# Patient Record
Sex: FEMALE | Race: WHITE | HISPANIC OR LATINO | Employment: PART TIME | URBAN - METROPOLITAN AREA
[De-identification: names, ages, dates, MRNs, and addresses within clinical notes are randomized per-mention and may not be internally consistent; named-entity substitution may affect disease eponyms.]

---

## 2017-03-08 ENCOUNTER — TELEPHONE (OUTPATIENT)
Dept: FAMILY MEDICINE | Facility: OTHER | Age: 37
End: 2017-03-08

## 2017-03-08 NOTE — TELEPHONE ENCOUNTER
Pt is past due for fu pap smear  Reminder letter was sent 1/13/17  LMTC and schedule at [facility]  Left this writers number in case of questions  If no reply and/or appt within two weeks (3/22/17) patient will be considered lost to pap tracking f/u.  Goldie Nesbitt,   Pap Tracking

## 2017-07-19 NOTE — PROGRESS NOTES
SUBJECTIVE:   CC: Hannah Young is an 37 year old woman who presents for preventive health visit.     Healthy Habits:    Do you get at least three servings of calcium containing foods daily (dairy, green leafy vegetables, etc.)? yes    Amount of exercise or daily activities, outside of work: none    Problems taking medications regularly No    Medication side effects: No    Have you had an eye exam in the past two years? no    Do you see a dentist twice per year? yes    Do you have sleep apnea, excessive snoring or daytime drowsiness?no          History of abnormal pap smear in the past , here for recheck pap    she reports her period has become heavier every other month the last 2 months and occasionally irregular , she is not on hormone pills,    usually will get light periods, but every other month , period will be heavy     Today's PHQ-2 Score:   PHQ-2 ( 1999 [employer]) 6/14/2016 1/15/2016   Q1: Little interest or pleasure in doing things 0 0   Q2: Feeling down, depressed or hopeless 0 0   PHQ-2 Score 0 0         Abuse: Current or Past(Physical, Sexual or Emotional)- No  Do you feel safe in your environment - Yes  Social History   Substance Use Topics     Smoking status: Never Smoker     Smokeless tobacco: Never Used      Comment: no smokers in household     Alcohol use 0.0 oz/week     0 Standard drinks or equivalent per week      Comment: wine cooler x2/3-4x per yr     The patient does not drink >3 drinks per day nor >7 drinks per week.    Reviewed orders with patient.  Reviewed health maintenance and updated orders accordingly - Yes  Labs reviewed in EPIC  BP Readings from Last 3 Encounters:   07/21/17 124/70   06/14/16 112/66   06/05/16 102/66    Wt Readings from Last 3 Encounters:   07/21/17 234 lb 14.4 oz (106.5 kg)   06/14/16 227 lb 1.6 oz (103 kg)   06/05/16 221 lb (100.2 kg)                  Patient Active Problem List   Diagnosis     Plantar fascial fibromatosis     Encounter for supervision of other  normal pregnancy     PFO (patent foramen ovale)     History of sepsis- blood clot and septic PICC, PFO found.  requesting records as they are archieved.      Hyperlipidemia LDL goal <130     MARGARETTE III (cervical intraepithelial neoplasia grade III) with severe dysplasia     Rheumatoid arthritis flare (H)     Chronic fatigue syndrome     Body mass index (BMI) greater than 30 in adult     Past Surgical History:   Procedure Laterality Date     HC CONIZATION CERVIX,KNIFE/LASER  10/04/2005    Cold knife conization of cervix and endometrial biopsy.     TUBAL LIGATION         Social History   Substance Use Topics     Smoking status: Never Smoker     Smokeless tobacco: Never Used      Comment: no smokers in household     Alcohol use 0.0 oz/week     0 Standard drinks or equivalent per week      Comment: wine cooler x2/3-4x per yr     Family History   Problem Relation Age of Onset     Respiratory Brother      Asthma     CANCER No family hx of      DIABETES No family hx of      HEART DISEASE No family hx of      Hypertension No family hx of      Lipids No family hx of          Current Outpatient Prescriptions   Medication Sig Dispense Refill     Naproxen Sodium (ALEVE PO) Take 220 mg by mouth as needed for moderate pain       traMADol (ULTRAM) 50 MG tablet Take 1-2 tablets ( mg) by mouth every 6 hours as needed for moderate pain (Patient not taking: Reported on 7/21/2017) 20 tablet 0     Allergies   Allergen Reactions     No Known Drug Allergies            Mammogram not appropriate for this patient based on age.    Pertinent mammograms are reviewed under the imaging tab.  History of abnormal Pap smear:   NO - age 30- 65 PAP every 3 years recommended  Last 3 Pap Results:   PAP (no units)   Date Value   01/15/2016 NIL   06/29/2005 ASC-US (A)       Reviewed and updated as needed this visit by clinical staff  Tobacco  Allergies  Meds  Problems  Med Hx  Surg Hx  Fam Hx  Soc Hx          Reviewed and updated as needed this  "visit by Provider  Allergies  Meds  Problems        Past Medical History:   Diagnosis Date     MARGARETTE III (cervical intraepithelial neoplasia grade III) with severe dysplasia 05    CKC, negative margins     NONSPECIFIC MEDICAL HISTORY     HPV/10-02     Normal delivery          Sepsis (H)      Stroke (H)      Varicella without mention of complication     Chickenpox      Past Surgical History:   Procedure Laterality Date     HC CONIZATION CERVIX,KNIFE/LASER  10/04/2005    Cold knife conization of cervix and endometrial biopsy.     TUBAL LIGATION         ROS:  C: NEGATIVE for fever, chills, change in weight  I: NEGATIVE for worrisome rashes, moles or lesions  E: NEGATIVE for vision changes or irritation  ENT: NEGATIVE for ear, mouth and throat problems  R: NEGATIVE for significant cough or SOB  B: NEGATIVE for masses, tenderness or discharge  CV: NEGATIVE for chest pain, palpitations or peripheral edema  GI: NEGATIVE for nausea, abdominal pain, heartburn, or change in bowel habits  : NEGATIVE for unusual urinary or vaginal symptoms. Periods are regular.  M: NEGATIVE for significant arthralgias or myalgia  N: NEGATIVE for weakness, dizziness or paresthesias  P: NEGATIVE for changes in mood or affect    OBJECTIVE:   /70  Pulse 84  Temp 98  F (36.7  C) (Temporal)  Resp 16  Ht 5' 6\" (1.676 m)  Wt 234 lb 14.4 oz (106.5 kg)  Breastfeeding? No  BMI 37.91 kg/m2  EXAM:  GENERAL:alert and no distress  EYES: Eyes grossly normal to inspection, PERRL and conjunctivae and sclerae normal  HENT: ear canals and TM's normal, nose and mouth without ulcers or lesions  NECK: no adenopathy, no asymmetry, masses, or scars and thyroid normal to palpation  RESP: lungs clear to auscultation - no rales, rhonchi or wheezes  BREAST: normal without masses, tenderness or nipple discharge and no palpable axillary masses or adenopathy  CV: regular rate and rhythm, normal S1 S2, no S3 or S4, no murmur, click or rub, no " "peripheral edema and peripheral pulses strong  ABDOMEN: soft, nontender, no hepatosplenomegaly, no masses and bowel sounds normal   (female): normal female external genitalia, normal urethral meatus, vaginal mucosa pink, moist, well rugated, and normal cervix/adnexa/uterus without masses or discharge  MS: no gross musculoskeletal defects noted, no edema  SKIN: no suspicious lesions or rashes  NEURO: Normal strength and tone, mentation intact and speech normal  PSYCH: mentation appears normal, affect normal/bright    ASSESSMENT/PLAN:       ICD-10-CM    1. Routine general medical examination at a health care facility Z00.00    2. Screening for cervical cancer Z12.4 Pap imaged thin layer diagnostic with HPV (select HPV order below)     HPV High Risk Types DNA Cervical   3. MARGARETTE III (cervical intraepithelial neoplasia grade III) with severe dysplasia D06.9 Pap imaged thin layer diagnostic with HPV (select HPV order below)     HPV High Risk Types DNA Cervical   4. Excessive or frequent menstruation N92.0       Discussed with patient , could be due to hormonal imbalance , also possible fibroids , discussed getting pelvic ultrasound and if normal , work up for hormonal imbalance , she reports does not happen all the time , will monitor, if ongoing will call to schedule for ultrasound and further work up     COUNSELING:   Reviewed preventive health counseling, as reflected in patient instructions         reports that she has never smoked. She has never used smokeless tobacco.    Estimated body mass index is 37.91 kg/(m^2) as calculated from the following:    Height as of this encounter: 5' 6\" (1.676 m).    Weight as of this encounter: 234 lb 14.4 oz (106.5 kg).   Weight management plan: Regular exercises and diet changes    Counseling Resources:  ATP IV Guidelines  Pooled Cohorts Equation Calculator  Breast Cancer Risk Calculator  FRAX Risk Assessment  ICSI Preventive Guidelines  Dietary Guidelines for Americans, " 2010  USDA's MyPlate  ASA Prophylaxis  Lung CA Screening    Matilde Ashley MD, MD  [facility]

## 2017-07-21 ENCOUNTER — OFFICE VISIT (OUTPATIENT)
Dept: FAMILY MEDICINE | Facility: OTHER | Age: 37
End: 2017-07-21
Payer: COMMERCIAL

## 2017-07-21 VITALS
WEIGHT: 234.9 LBS | RESPIRATION RATE: 16 BRPM | BODY MASS INDEX: 37.75 KG/M2 | HEIGHT: 66 IN | HEART RATE: 84 BPM | TEMPERATURE: 98 F | DIASTOLIC BLOOD PRESSURE: 70 MMHG | SYSTOLIC BLOOD PRESSURE: 124 MMHG

## 2017-07-21 DIAGNOSIS — Z12.4 SCREENING FOR CERVICAL CANCER: ICD-10-CM

## 2017-07-21 DIAGNOSIS — N92.0 EXCESSIVE OR FREQUENT MENSTRUATION: ICD-10-CM

## 2017-07-21 DIAGNOSIS — Z00.00 ROUTINE GENERAL MEDICAL EXAMINATION AT A HEALTH CARE FACILITY: Primary | ICD-10-CM

## 2017-07-21 DIAGNOSIS — D06.9 CIN III (CERVICAL INTRAEPITHELIAL NEOPLASIA GRADE III) WITH SEVERE DYSPLASIA: ICD-10-CM

## 2017-07-21 PROCEDURE — 87624 HPV HI-RISK TYP POOLED RSLT: CPT | Performed by: FAMILY MEDICINE

## 2017-07-21 PROCEDURE — 99395 PREV VISIT EST AGE 18-39: CPT | Performed by: FAMILY MEDICINE

## 2017-07-21 PROCEDURE — 99213 OFFICE O/P EST LOW 20 MIN: CPT | Mod: 25 | Performed by: FAMILY MEDICINE

## 2017-07-21 PROCEDURE — 88175 CYTOPATH C/V AUTO FLUID REDO: CPT | Performed by: FAMILY MEDICINE

## 2017-07-21 PROCEDURE — G0476 HPV COMBO ASSAY CA SCREEN: HCPCS | Performed by: FAMILY MEDICINE

## 2017-07-21 ASSESSMENT — PAIN SCALES - GENERAL: PAINLEVEL: NO PAIN (0)

## 2017-07-21 NOTE — MR AVS SNAPSHOT
After Visit Summary   7/21/2017    Hannah Young    MRN: [id number]           Patient Information     Date Of Birth          1980        Visit Information        Provider Department      7/21/2017 3:20 PM Matilde Ashley MD [facility]        Today's Diagnoses     Routine general medical examination at a health care facility    -  1    Screening for cervical cancer        MARGARETTE III (cervical intraepithelial neoplasia grade III) with severe dysplasia          Care Instructions      Preventive Health Recommendations  Female Ages 26 - 39  Yearly exam:   See your health care provider every year in order to    Review health changes.     Discuss preventive care.      Review your medicines if you your doctor has prescribed any.    Until age 30: Get a Pap test every three years (more often if you have had an abnormal result).    After age 30: Talk to your doctor about whether you should have a Pap test every 3 years or have a Pap test with HPV screening every 5 years.   You do not need a Pap test if your uterus was removed (hysterectomy) and you have not had cancer.  You should be tested each year for STDs (sexually transmitted diseases), if you're at risk.   Talk to your provider about how often to have your cholesterol checked.  If you are at risk for diabetes, you should have a diabetes test (fasting glucose).  Shots: Get a flu shot each year. Get a tetanus shot every 10 years.   Nutrition:     Eat at least 5 servings of fruits and vegetables each day.    Eat whole-grain bread, whole-wheat pasta and brown rice instead of white grains and rice.    Talk to your provider about Calcium and Vitamin D.     Lifestyle    Exercise at least 150 minutes a week (30 minutes a day, 5 days of the week). This will help you control your weight and prevent disease.    Limit alcohol to one drink per day.    No smoking.     Wear sunscreen to prevent skin cancer.    See your dentist every six months  "for an exam and cleaning.            Follow-ups after your visit        Who to contact     If you have questions or need follow up information about today's clinic visit or your schedule please contact [facility] directly at [phone].  Normal or non-critical lab and imaging results will be communicated to you by theDrophart, letter or phone within 4 business days after the clinic has received the results. If you do not hear from us within 7 days, please contact the clinic through theDrophart or phone. If you have a critical or abnormal lab result, we will notify you by phone as soon as possible.  Submit refill requests through [employer] or call your pharmacy and they will forward the refill request to us. Please allow 3 business days for your refill to be completed.          Additional Information About Your Visit        theDrophariCAD Information     [employer] lets you send messages to your doctor, view your test results, renew your prescriptions, schedule appointments and more. To sign up, go to [email or web address] . Click on \"Log in\" on the left side of the screen, which will take you to the Welcome page. Then click on \"Sign up Now\" on the right side of the page.     You will be asked to enter the access code listed below, as well as some personal information. Please follow the directions to create your username and password.     Your access code is: [email or web address]  Expires: 10/19/2017  3:56 PM     Your access code will  in 90 days. If you need help or a new code, please call your [facility] or [phone].        Care EveryWhere ID     This is your Care EveryWhere ID. This could be used by other organizations to access your [facility] medical records  [id number]        Your Vitals Were     Pulse Temperature Respirations Height Breastfeeding? BMI (Body Mass Index)    84 98  F (36.7  C) (Temporal) 16 5' 6\" (1.676 m) No 37.91 kg/m2       Blood Pressure from Last 3 Encounters:   17 124/70 "   06/14/16 112/66   06/05/16 102/66    Weight from Last 3 Encounters:   07/21/17 234 lb 14.4 oz (106.5 kg)   06/14/16 227 lb 1.6 oz (103 kg)   06/05/16 221 lb (100.2 kg)              We Performed the Following     HPV High Risk Types DNA Cervical     Pap imaged thin layer diagnostic with HPV (select HPV order below)        Primary Care Provider Office Phone # Fax #    Radha UREÑA MD Behzad [phone]       [facility] [address] [facility] [address]  [facility] [address]        Equal Access to Services     [facility]: Hadii reanna ku hadasho Soomaali, waaxda luqadaha, qaybta kaalmada adelashaunyada, meka jamison . So [facility] [phone].    ATENCIÓN: Si habla español, tiene a rojo disposición servicios gratuitos de asistencia lingüística. LlSelect [facility] [phone].    We comply with applicable federal civil rights laws and [place] laws. We do not discriminate on the basis of race, color, national origin, age, disability sex, sexual orientation or gender identity.            Thank you!     Thank you for choosing [facility]  for your care. Our goal is always to provide you with excellent care. Hearing back from our patients is one way we can continue to improve our services. Please take a few minutes to complete the written survey that you may receive in the mail after your visit with us. Thank you!             Your Updated Medication List - Protect others around you: Learn how to safely use, store and throw away your medicines at www.disposemymeds.org.          This list is accurate as of: 7/21/17  3:56 PM.  Always use your most recent med list.                   Brand Name Dispense Instructions for use Diagnosis    ALEVE PO      Take 220 mg by mouth as needed for moderate pain        traMADol 50 MG tablet    ULTRAM    20 tablet    Take 1-2 tablets ( mg) by mouth every 6 hours as needed for moderate pain    Joint swelling, Arthralgia of both knees

## 2017-07-21 NOTE — LETTER
August 3, 2017    Hannah Young  [address] [place] [address]    Dear Hannah,  We are happy to inform you that your PAP smear result from 7/21/17 is normal.  We are now able to do a follow up test on PAP smears. The DNA test is for HPV (Human Papilloma Virus). Cervical cancer is closely linked with certain types of HPV. Your result showed no evidence of HPV.  Therefore we recommend you return in 3 years for your next pap smear and HPV test.  You will still need to return to the clinic every year for an annual exam and other preventive tests.  Please contact the clinic at [phone] with any questions.  Sincerely,    Matilde Ashley MD/emily

## 2017-07-21 NOTE — NURSING NOTE
"Chief Complaint   Patient presents with     Physical     Panel Management     papclaribel        Initial /70  Pulse 84  Temp 98  F (36.7  C) (Temporal)  Resp 16  Ht 5' 6\" (1.676 m)  Wt 234 lb 14.4 oz (106.5 kg)  Breastfeeding? No  BMI 37.91 kg/m2 Estimated body mass index is 37.91 kg/(m^2) as calculated from the following:    Height as of this encounter: 5' 6\" (1.676 m).    Weight as of this encounter: 234 lb 14.4 oz (106.5 kg).  Medication Reconciliation: complete     Marcelle Caballero MA    "

## 2017-07-26 LAB
COPATH REPORT: NORMAL
PAP: NORMAL

## 2017-07-27 ENCOUNTER — TRANSFERRED RECORDS (OUTPATIENT)
Dept: HEALTH INFORMATION MANAGEMENT | Facility: CLINIC | Age: 37
End: 2017-07-27

## 2017-07-27 LAB
FINAL DIAGNOSIS: NORMAL
HPV HR 12 DNA CVX QL NAA+PROBE: NEGATIVE
HPV16 DNA SPEC QL NAA+PROBE: NEGATIVE
HPV18 DNA SPEC QL NAA+PROBE: NEGATIVE
SPECIMEN DESCRIPTION: NORMAL

## 2020-05-27 ENCOUNTER — HOSPITAL ENCOUNTER (EMERGENCY)
Facility: CLINIC | Age: 40
Discharge: HOME | End: 2020-05-27
Attending: FAMILY MEDICINE | Admitting: FAMILY MEDICINE
Payer: COMMERCIAL

## 2020-05-27 ENCOUNTER — NURSE TRIAGE (OUTPATIENT)
Dept: NURSING | Facility: CLINIC | Age: 40
End: 2020-05-27

## 2020-05-27 VITALS
SYSTOLIC BLOOD PRESSURE: 144 MMHG | TEMPERATURE: 98.4 F | BODY MASS INDEX: 42.11 KG/M2 | RESPIRATION RATE: 18 BRPM | WEIGHT: 260.9 LBS | OXYGEN SATURATION: 96 % | DIASTOLIC BLOOD PRESSURE: 90 MMHG

## 2020-05-27 DIAGNOSIS — R42 DIZZINESS: ICD-10-CM

## 2020-05-27 DIAGNOSIS — K62.5 BRIGHT RED BLOOD PER RECTUM: ICD-10-CM

## 2020-05-27 LAB
BASOPHILS # BLD AUTO: 0 10E9/L (ref 0–0.2)
BASOPHILS NFR BLD AUTO: 0.4 %
DIFFERENTIAL METHOD BLD: NORMAL
EOSINOPHIL NFR BLD AUTO: 0.8 %
ERYTHROCYTE [DISTWIDTH] IN BLOOD BY AUTOMATED COUNT: 12.1 % (ref 10–15)
HCT VFR BLD AUTO: 39.5 % (ref 35–47)
HGB BLD-MCNC: 13.3 G/DL (ref 11.7–15.7)
IMM GRANULOCYTES # BLD: 0 10E9/L (ref 0–0.4)
IMM GRANULOCYTES NFR BLD: 0.3 %
LYMPHOCYTES # BLD AUTO: 1.8 10E9/L (ref 0.8–5.3)
LYMPHOCYTES NFR BLD AUTO: 24.6 %
MCH RBC QN AUTO: 32.1 PG (ref 26.5–33)
MCHC RBC AUTO-ENTMCNC: 33.7 G/DL (ref 31.5–36.5)
MCV RBC AUTO: 95 FL (ref 78–100)
MONOCYTES # BLD AUTO: 0.5 10E9/L (ref 0–1.3)
MONOCYTES NFR BLD AUTO: 6.3 %
NEUTROPHILS # BLD AUTO: 4.8 10E9/L (ref 1.6–8.3)
NEUTROPHILS NFR BLD AUTO: 67.6 %
NRBC # BLD AUTO: 0 10*3/UL
NRBC BLD AUTO-RTO: 0 /100
PLATELET # BLD AUTO: 246 10E9/L (ref 150–450)
RBC # BLD AUTO: 4.14 10E12/L (ref 3.8–5.2)
WBC # BLD AUTO: 7.1 10E9/L (ref 4–11)

## 2020-05-27 PROCEDURE — 99282 EMERGENCY DEPT VISIT SF MDM: CPT | Mod: Z6 | Performed by: FAMILY MEDICINE

## 2020-05-27 PROCEDURE — 85025 COMPLETE CBC W/AUTO DIFF WBC: CPT | Performed by: FAMILY MEDICINE

## 2020-05-27 PROCEDURE — 99283 EMERGENCY DEPT VISIT LOW MDM: CPT | Performed by: FAMILY MEDICINE

## 2020-05-27 ASSESSMENT — ENCOUNTER SYMPTOMS
FEVER: 0
CHILLS: 0

## 2020-05-27 NOTE — ED PROVIDER NOTES
History     Chief Complaint   Patient presents with     Rectal Bleeding     HPI  Hannah Pacheco is a 40 year old female who presents to the emergency department with several symptoms.  Her main complaint is that she had a bowel movement this morning and she noticed that she passed some bright red blood.  There was one small clot.  She had a completely painless defecation.  She denies any history of severe constipation or hard stools.  She had 3 children with vaginal births without trouble but this last child was 12 years ago.  She has no history of diagnosis of hemorrhoids.  She has no sensation of rectal pain.  She does not feel any hemorrhoids.  She has never had a colonoscopy.  She has no history of melena.  She also had an episode where she felt a little lightheaded and dizzy a little weak like she might pass out.  She has no history of recent anemia.  Her periods are a little heavier than usual but not inordinate.  She is not due for a period for another week.  She has not missed any.  She also had an episode where she had numbness and tingling.  This episode was more on the left side.  She was diagnosed with a patent foramen ovale during her pregnancy but was checked 1 year later by cardiology and none was found.  No further work-up or intervention was recommended.  She has no history of other strokelike symptoms.  She otherwise is feeling well.    Allergies:  Allergies   Allergen Reactions     No Known Drug Allergies        Problem List:    Patient Active Problem List    Diagnosis Date Noted     Rheumatoid arthritis flare (H) 06/14/2016     Priority: Medium     Chronic fatigue syndrome 06/14/2016     Priority: Medium     PFO (patent foramen ovale) 01/15/2016     Priority: Medium     History of sepsis- blood clot and septic PICC, PFO found.  requesting records as they are archieved.  01/15/2016     Priority: Medium     Hyperlipidemia LDL goal <130 01/15/2016     Priority: Medium     Body mass index (BMI)  greater than 30 in adult 11/14/2012     Priority: Medium     Encounter for supervision of other normal pregnancy 05/01/2006     Priority: Medium     Diagnosis updated by automated process. Provider to review and confirm.       MARGARETTE III (cervical intraepithelial neoplasia grade III) with severe dysplasia 09/05/2005     Priority: Medium     10/11/02 ASCUS  9/11/03 LSIL/CIN1  6/29/05 ASCUS/ + HR HPV (16)  8/15/05 colp: MARGARETTE 2  9/12/05 ECC: CIN3, negative margins  10/4/05 CKC: EMB WNL, ECC WNL, Cervix bx CIN3, negative margins  12/9/06, 11/16/07, 11/3/12, paps all NIL  1/15/16 pap NIL/neg HR HPV. First cotest since [facility]. Plan: repeat cotest in 12 mo.   04/08/17 Would consider patient to be lost to follow-up.  7/21/17 NIL Pap, Neg HPV. Plan cotest in 3 years.        Plantar fascial fibromatosis 09/11/2003     Priority: Medium        Past Medical History:    Past Medical History:   Diagnosis Date     MARGARETTE III (cervical intraepithelial neoplasia grade III) with severe dysplasia 9/5/05     NONSPECIFIC MEDICAL HISTORY      Normal delivery      Sepsis (H)      Stroke (H)      Varicella without mention of complication        Past Surgical History:    Past Surgical History:   Procedure Laterality Date     HC CONIZATION CERVIX,KNIFE/LASER  10/04/2005    Cold knife conization of cervix and endometrial biopsy.     TUBAL LIGATION         Family History:    Family History   Problem Relation Age of Onset     Respiratory Brother         Asthma     Cancer No family hx of      Diabetes No family hx of      Heart Disease No family hx of      Hypertension No family hx of      Lipids No family hx of        Social History:  Marital Status:   [2]  Social History     Tobacco Use     Smoking status: Never Smoker     Smokeless tobacco: Never Used     Tobacco comment: no smokers in household   Substance Use Topics     Alcohol use: Yes     Alcohol/week: 0.0 standard drinks     Comment: wine cooler x2/3-4x per yr     Drug use: No         Medications:    Naproxen Sodium (ALEVE PO)  traMADol (ULTRAM) 50 MG tablet          Review of Systems   Constitutional: Negative for chills and fever.   All other systems reviewed and are negative.      Physical Exam   BP: (!) 144/90  Heart Rate: 80  Temp: 98.4  F (36.9  C)  Resp: 18  Weight: 118.3 kg (260 lb 14.4 oz)  SpO2: 96 %      Physical Exam  Vitals signs and nursing note reviewed.   Constitutional:       Appearance: Normal appearance.      Comments: Alert pleasant smiling well-nourished well-hydrated 40-year-old who appears in no acute distress or discomfort.BP (!) 144/90   Temp 98.4  F (36.9  C) (Oral)   Resp 18   Wt 118.3 kg (260 lb 14.4 oz)   SpO2 96%   BMI 42.11 kg/m       HENT:      Head: Normocephalic and atraumatic.      Right Ear: Tympanic membrane normal.      Left Ear: Tympanic membrane normal.      Nose: Nose normal.      Mouth/Throat:      Mouth: Mucous membranes are moist.   Eyes:      Extraocular Movements: Extraocular movements intact.      Pupils: Pupils are equal, round, and reactive to light.   Neck:      Musculoskeletal: Normal range of motion.   Cardiovascular:      Rate and Rhythm: Normal rate and regular rhythm.      Pulses: Normal pulses.   Pulmonary:      Effort: Pulmonary effort is normal.   Genitourinary:     Comments: Declined rectal examination  Musculoskeletal: Normal range of motion.   Skin:     Capillary Refill: Capillary refill takes less than 2 seconds.   Neurological:      General: No focal deficit present.      Mental Status: She is alert and oriented to person, place, and time.   Psychiatric:         Mood and Affect: Mood normal.         Behavior: Behavior normal.         ED Course        Procedures               Critical Care time:  none               Results for orders placed or performed during the hospital encounter of 05/27/20 (from the past 24 hour(s))   CBC with platelets differential   Result Value Ref Range    WBC 7.1 4.0 - 11.0 10e9/L    RBC Count 4.14  3.8 - 5.2 10e12/L    Hemoglobin 13.3 11.7 - 15.7 g/dL    Hematocrit 39.5 35.0 - 47.0 %    MCV 95 78 - 100 fl    MCH 32.1 26.5 - 33.0 pg    MCHC 33.7 31.5 - 36.5 g/dL    RDW 12.1 10.0 - 15.0 %    Platelet Count 246 150 - 450 10e9/L    Diff Method Automated Method     % Neutrophils 67.6 %    % Lymphocytes 24.6 %    % Monocytes 6.3 %    % Eosinophils 0.8 %    % Basophils 0.4 %    % Immature Granulocytes 0.3 %    Nucleated RBCs 0 0 /100    Absolute Neutrophil 4.8 1.6 - 8.3 10e9/L    Absolute Lymphocytes 1.8 0.8 - 5.3 10e9/L    Absolute Monocytes 0.5 0.0 - 1.3 10e9/L    Absolute Basophils 0.0 0.0 - 0.2 10e9/L    Abs Immature Granulocytes 0.0 0 - 0.4 10e9/L    Absolute Nucleated RBC 0.0        Medications - No data to display    Assessments & Plan (with Medical Decision Making)   MDM--40-year-old female with bright red blood per rectum.  I discussed the exam involved in this type of work-up and she declined on any type of rectal or proctoscopy.  I also did not feel it was necessary as her source of bleeding is most likely internal hemorrhoids.  She has varicose veins from pregnancies.  Discussed the need for further work-up if this persists.  Discussed a high-fiber diet.  I did check her hemoglobin as she did have an episode where she felt lightheaded and weak and there is no evidence of anemia as in etiology for this episode.  She also had an episode of numbness which was worse on her left side which has completely resolved.  Neurologically everything is normal and she has no symptoms and she has a normal neurological exam.  She at one time was told she had a patent foramen ovale but then was told a year later that none could be found and that she did not need any further work-up.  I do not feel she needs any neurological work-up at this point.  Patient reassured and discharged home in stable condition.  She is comfortable with this evaluation limited work-up and is discharged in stable condition.      I have reviewed  the nursing notes.    I have reviewed the findings, diagnosis, plan and need for follow up with the patient.       Discharge Medication List as of 5/27/2020  3:34 PM          Final diagnoses:   Bright red blood per rectum   Dizziness       5/27/2020   [facility] EMERGENCY DEPARTMENT     Natan, Eleazar PINON MD  05/27/20 [id number]

## 2020-05-27 NOTE — ED AVS SNAPSHOT
[facility] Emergency Department  [address] [place] [address]  Phone:  [phone]  Fax:  [phone]                                    Hannah Pacheco   MRN: [id number]    Department:  [facility] Emergency Department   Date of Visit:  5/27/2020           After Visit Summary Signature Page    I have received my discharge instructions, and my questions have been answered. I have discussed any challenges I see with this plan with the nurse or doctor.    ..........................................................................................................................................  Patient/Patient Representative Signature      ..........................................................................................................................................  Patient Representative Print Name and Relationship to Patient    ..................................................               ................................................  Date                                   Time    ..........................................................................................................................................  Reviewed by Signature/Title    ...................................................              ..............................................  Date                                               Time          22EPIC Rev 08/18

## 2020-05-27 NOTE — ED TRIAGE NOTES
Had a bloody stool this morning, states there was a large clot, and a few small ones.  also had numbness on the left side of her body this morning for about 10 min.  did have a tooth pulled yesterday on the left side of her mouth, currently no issues with this.

## 2020-05-27 NOTE — TELEPHONE ENCOUNTER
Pt states she does not have a PCP.   Today she is having some bleeding in her stool. States it is coming out as clots.   States some discomfort in left side, rates it as a mild/moderate pain.   Denies fever.   Feeling slightly dizzy.   Toilet bowl was red.   Numb in hand, arm and foot on the left side, same side as a weird discomfort. States it lasted 10 minute, denies this currently  Large blood clots, some smaller than golf ball size and some bigger than a dime.  Pt advised of recommendation to go to ED, have someone drive her. Pt stated understanding and will have someone drive her to the ED for evaluation.    Aida Kaplan, RN   [facility] RN Triage        COVID 19 Nurse Triage Plan/Patient Instructions    Please be aware that novel coronavirus (COVID-19) may be circulating in the community. If you develop symptoms such as fever, cough, or SOB or if you have concerns about the presence of another infection including coronavirus (COVID-19), please contact your health care provider or visit [email or web address].     Disposition/Instructions    Patient to go to ED and follow protocol based instructions. Follow System Ambulatory Workflow for COVID 19.     Bring Your Own Device:  Please also bring your smart device(s) (smart phones, tablets, laptops) and their charging cables for your personal use and to communicate with your care team during your visit.    Thank you for limiting contact with others, wearing a simple mask to cover your cough, practice good hand hygiene habits and accessing our virtual services where possible to limit the spread of this virus.    For more information about COVID19 and options for caring for yourself at home, please visit the CDC website at https://www.cdc.gov/coronavirus/2019-ncov/about/steps-when-sick.html  For more options for care at [facility], please visit our website at [email or web address]    For more information, please use the [place]  "Department of Health COVID-19 Website: https://www.health.[facility].[place]/diseases/coronavirus/index.html  [place] Department of Health ([facility]) COVID-19 Hotlines (Interpreters available):      Health questions: Phone Number: [phone] or [phone] and Hours: 7 a.m. to 7 p.m.    Schools and  questions: Phone Number: [phone] or [phone] and Hours 7 a.m. to 7 p.m.      Reason for Disposition    SEVERE rectal bleeding (large blood clots; on and off, or constant bleeding)    [1] Numbness (i.e., loss of sensation) of the face, arm / hand, or leg / foot on one side of the body AND [2] sudden onset AND [3] brief (now gone)    Additional Information    Negative: Shock suspected (e.g., cold/pale/clammy skin, too weak to stand, low BP, rapid pulse)    Negative: Passed out (i.e., lost consciousness, collapsed and was not responding)    Negative: [1] Vomiting AND [2] contains red blood or black (\"coffee ground\") material  (Exception: few red streaks in vomit that only happened once)    Negative: Sounds like a life-threatening emergency to the triager    Negative: Difficult to awaken or acting confused (e.g., disoriented, slurred speech)    Negative: Diarrhea is main symptom    Negative: Stool color other than brown or tan is main concern  (no bleeding and no melena)    Negative: [1] Numbness (i.e., loss of sensation) of the face, arm / hand, or leg / foot on one side of the body AND [2] sudden onset AND [3] present now    Negative: [1] Weakness (i.e., paralysis, loss of muscle strength) of the face, arm / hand, or leg / foot on one side of the body AND [2] sudden onset AND [3] present now    Negative: [1] SEVERE weakness (i.e., unable to walk or barely able to walk, requires support) AND [2] new onset or worsening    Negative: [1] Loss of speech or garbled speech AND [2] sudden onset AND [3] present now    Negative: Difficult to awaken or acting confused (e.g., disoriented, slurred speech)    Negative: " Sounds like a life-threatening emergency to the triager    Negative: Headache  (and neurologic deficit)    Negative: [1] Back pain AND [2] numbness (loss of sensation) in groin or rectal area    Negative: [1] Unable to urinate (or only a few drops) > 4 hours AND [2] bladder feels very full (e.g., palpable bladder or strong urge to urinate)    Negative: [1] Loss of bladder or bowel control (urine or bowel incontinence; wetting self, leaking stool) AND [2] new onset    Negative: [1] Weakness (i.e., paralysis, loss of muscle strength) of the face, arm / hand, or leg / foot on one side of the body AND [2] sudden onset AND [3] brief (now gone)    Protocols used: RECTAL BLEEDING-A-AH, NEUROLOGIC DEFICIT-A-AH

## 2023-01-19 ENCOUNTER — OFFICE VISIT (OUTPATIENT)
Dept: FAMILY MEDICINE | Facility: CLINIC | Age: 43
End: 2023-01-19
Payer: COMMERCIAL

## 2023-01-19 VITALS
SYSTOLIC BLOOD PRESSURE: 136 MMHG | TEMPERATURE: 98.2 F | BODY MASS INDEX: 41.29 KG/M2 | HEART RATE: 80 BPM | OXYGEN SATURATION: 96 % | WEIGHT: 256.9 LBS | HEIGHT: 66 IN | DIASTOLIC BLOOD PRESSURE: 82 MMHG | RESPIRATION RATE: 16 BRPM

## 2023-01-19 DIAGNOSIS — N92.0 MENORRHAGIA WITH REGULAR CYCLE: ICD-10-CM

## 2023-01-19 DIAGNOSIS — Z12.4 CERVICAL CANCER SCREENING: ICD-10-CM

## 2023-01-19 DIAGNOSIS — E78.5 HYPERLIPIDEMIA LDL GOAL <130: ICD-10-CM

## 2023-01-19 DIAGNOSIS — Z00.00 ROUTINE GENERAL MEDICAL EXAMINATION AT A HEALTH CARE FACILITY: Primary | ICD-10-CM

## 2023-01-19 DIAGNOSIS — Z12.31 ENCOUNTER FOR SCREENING MAMMOGRAM FOR BREAST CANCER: ICD-10-CM

## 2023-01-19 DIAGNOSIS — E66.01 MORBID OBESITY (H): ICD-10-CM

## 2023-01-19 DIAGNOSIS — M05.9 RHEUMATOID ARTHRITIS WITH POSITIVE RHEUMATOID FACTOR, INVOLVING UNSPECIFIED SITE (H): ICD-10-CM

## 2023-01-19 DIAGNOSIS — Z13.1 ENCOUNTER FOR SCREENING EXAMINATION FOR IMPAIRED GLUCOSE REGULATION AND DIABETES MELLITUS: ICD-10-CM

## 2023-01-19 PROCEDURE — G0145 SCR C/V CYTO,THINLAYER,RESCR: HCPCS | Performed by: NURSE PRACTITIONER

## 2023-01-19 PROCEDURE — 99386 PREV VISIT NEW AGE 40-64: CPT | Performed by: NURSE PRACTITIONER

## 2023-01-19 PROCEDURE — 99213 OFFICE O/P EST LOW 20 MIN: CPT | Mod: 25 | Performed by: NURSE PRACTITIONER

## 2023-01-19 PROCEDURE — 87624 HPV HI-RISK TYP POOLED RSLT: CPT | Performed by: NURSE PRACTITIONER

## 2023-01-19 ASSESSMENT — ENCOUNTER SYMPTOMS
COUGH: 1
FEVER: 0
EYE PAIN: 0
HEADACHES: 0
NERVOUS/ANXIOUS: 0
PARESTHESIAS: 0
CHILLS: 0
SORE THROAT: 0
FREQUENCY: 0
HEMATURIA: 0
SHORTNESS OF BREATH: 0
JOINT SWELLING: 0
HEMATOCHEZIA: 0
CONSTIPATION: 0
WEAKNESS: 0
NAUSEA: 0
ARTHRALGIAS: 1
ABDOMINAL PAIN: 0
HEARTBURN: 1
MYALGIAS: 0
DYSURIA: 0
PALPITATIONS: 0
DIZZINESS: 0
BREAST MASS: 0

## 2023-01-19 ASSESSMENT — PAIN SCALES - GENERAL: PAINLEVEL: NO PAIN (0)

## 2023-01-19 NOTE — PROGRESS NOTES
SUBJECTIVE:   CC: Hannah is an 42 year old who presents for preventive health visit.     Patient has been advised of split billing requirements and indicates understanding: Yes  Healthy Habits:     Getting at least 3 servings of Calcium per day:  Yes    Bi-annual eye exam:  NO    Dental care twice a year:  Yes    Sleep apnea or symptoms of sleep apnea:  None    Diet:  Regular (no restrictions)    Frequency of exercise:  1 day/week    Duration of exercise:  30-45 minutes    Taking medications regularly:  Not Applicable    Medication side effects:  Not applicable    PHQ-2 Total Score: 0    Additional concerns today:  No    Menstrual cycle- is every 30 days.  Sometimes a week or two late.  Lasts 7 days.  Will have some spotting and then will have 4-5 days and then start.  Heavy bleeding 4-5 days- changes pad or tampon could be every 20-30 minutes Has a prolapse- when has bm will have to push on vagina to get out the back.  Makes her feel constipated- only on cycle.  Think has hemorrhoids.  Sometimes when goes to bathroom has blood- mostly with wiping.  Some in toilet.  Has tubal    When not on cycle- when on cycle feels       Today's PHQ-2 Score:   PHQ-2 ( 1999 [employer]) 1/19/2023   Q1: Little interest or pleasure in doing things 0   Q2: Feeling down, depressed or hopeless 0   PHQ-2 Score 0   Q1: Little interest or pleasure in doing things Not at all   Q2: Feeling down, depressed or hopeless Not at all   PHQ-2 Score 0       Have you ever done Advance Care Planning? (For example, a Health Directive, POLST, or a discussion with a medical provider or your loved ones about your wishes): No, advance care planning information given to patient to review.  Patient plans to discuss their wishes with loved ones or provider.      Social History     Tobacco Use     Smoking status: Never     Smokeless tobacco: Never     Tobacco comments:     no smokers in household   Substance Use Topics     Alcohol use: Yes     Alcohol/week: 0.0  standard drinks     Comment: wine cooler x2/3-4x per yr     If you drink alcohol do you typically have >3 drinks per day or >7 drinks per week? Not applicable    Alcohol Use 2023   Prescreen: >3 drinks/day or >7 drinks/week? Not Applicable   Prescreen: >3 drinks/day or >7 drinks/week? -       Reviewed orders with patient.  Reviewed health maintenance and updated orders accordingly - Yes  Lab work is in process  Labs reviewed in EPIC    Breast Cancer Screening:    FHS-7: No flowsheet data found.    Mammogram Screening - Offered annual screening and updated Health Maintenance for mutual plan based on risk factor consideration    Pertinent mammograms are reviewed under the imaging tab.    History of abnormal Pap smear: YES - updated in Problem List and Health Maintenance accordingly  PAP / HPV Latest Ref Rng & Units 2017 1/15/2016 2005   PAP (Historical) - NIL NIL ASC-US(A)   HPV16 NEG Negative Negative -   HPV18 NEG Negative Negative -   HRHPV NEG Negative Negative -     Reviewed and updated as needed this visit by clinical staff   Tobacco  Allergies  Meds  Problems  Med Hx  Surg Hx  Fam Hx          Reviewed and updated as needed this visit by Provider   Tobacco  Allergies  Meds  Problems  Med Hx  Surg Hx  Fam Hx         Past Medical History:   Diagnosis Date     MARGARETTE III (cervical intraepithelial neoplasia grade III) with severe dysplasia 05    CKC, negative margins     NONSPECIFIC MEDICAL HISTORY     HPV/10-02     Normal delivery          Sepsis (H)      Stroke (H)      Varicella without mention of complication     Chickenpox      Past Surgical History:   Procedure Laterality Date      CONIZATION CERVIX,KNIFE/LASER  10/04/2005    Cold knife conization of cervix and endometrial biopsy.     TUBAL LIGATION         Review of Systems   Constitutional: Negative for chills and fever.   HENT: Negative for congestion, ear pain, hearing loss and sore throat.    Eyes: Negative for pain  "and visual disturbance.   Respiratory: Positive for cough. Negative for shortness of breath.    Cardiovascular: Negative for chest pain, palpitations and peripheral edema.   Gastrointestinal: Positive for heartburn. Negative for abdominal pain, constipation, hematochezia and nausea.   Breasts:  Negative for tenderness, breast mass and discharge.   Genitourinary: Positive for vaginal discharge. Negative for dysuria, frequency, hematuria, pelvic pain, urgency and vaginal bleeding.   Musculoskeletal: Positive for arthralgias. Negative for joint swelling and myalgias.   Skin: Negative for rash.   Neurological: Negative for dizziness, weakness, headaches and paresthesias.   Psychiatric/Behavioral: Negative for mood changes. The patient is not nervous/anxious.      CONSTITUTIONAL: NEGATIVE for fever, chills, change in weight  INTEGUMENTARU/SKIN: NEGATIVE for worrisome rashes, moles or lesions  EYES: NEGATIVE for vision changes or irritation  ENT: NEGATIVE for ear, mouth and throat problems  RESP: NEGATIVE for significant cough or SOB  BREAST: NEGATIVE for masses, tenderness or discharge  CV: NEGATIVE for chest pain, palpitations or peripheral edema  GI: NEGATIVE for nausea, abdominal pain, heartburn, or change in bowel habits  : NEGATIVE for unusual urinary or vaginal symptoms. Periods are regular.  MUSCULOSKELETAL: NEGATIVE for significant arthralgias or myalgia  NEURO: NEGATIVE for weakness, dizziness or paresthesias  PSYCHIATRIC: NEGATIVE for changes in mood or affect     OBJECTIVE:   /82 (BP Location: Right arm, Patient Position: Chair)   Pulse 80   Temp 98.2  F (36.8  C) (Temporal)   Resp 16   Ht 1.676 m (5' 6\")   Wt 116.5 kg (256 lb 14.4 oz)   LMP 01/02/2023   SpO2 96%   BMI 41.46 kg/m    Physical Exam  GENERAL: healthy, alert and no distress  EYES: Eyes grossly normal to inspection, PERRL and conjunctivae and sclerae normal  HENT: ear canals and TM's normal, nose and mouth without ulcers or " lesions  NECK: no adenopathy, no asymmetry, masses, or scars and thyroid normal to palpation  RESP: lungs clear to auscultation - no rales, rhonchi or wheezes  BREAST: normal without masses, tenderness or nipple discharge and no palpable axillary masses or adenopathy  CV: regular rate and rhythm, normal S1 S2, no S3 or S4, no murmur, click or rub, no peripheral edema and peripheral pulses strong  ABDOMEN: soft, nontender, no hepatosplenomegaly, no masses and bowel sounds normal   (female): normal female external genitalia, normal urethral meatus, vaginal mucosa pink, moist, well rugated, and normal cervix/adnexa/uterus without masses or discharge  MS: no gross musculoskeletal defects noted, no edema  SKIN: no suspicious lesions or rashes  NEURO: Normal strength and tone, mentation intact and speech normal  PSYCH: mentation appears normal, affect normal/bright    Diagnostic Test Results:  Labs reviewed in Epic  No results found for this or any previous visit (from the past 24 hour(s)).    ASSESSMENT/PLAN:   (Z00.00) Routine general medical examination at a health care facility  (primary encounter diagnosis)  Comment: recommend yearly physical    (M05.9) Rheumatoid arthritis with positive rheumatoid factor, involving unspecified site (H)  Comment: told positive in past.  Has not had treatment or follow up.  Requesting this today- referral sent.    Referral Rheumatology    (E66.01) Morbid obesity (H)  Comment: recommend dietary and lifestyle changes    (N92.0) Menorrhagia with regular cycle  Comment: pelvic us- check labs  Plan: US Pelvic Complete with Transvaginal, TSH with         free T4 reflex, Ferritin, CBC with platelets    (E78.5) Hyperlipidemia LDL goal <130  Comment: check lipids  Plan: Lipid panel reflex to direct LDL Non-fasting    (Z13.1) Encounter for screening examination for impaired glucose regulation and diabetes mellitus  Comment: screen glucose  Plan: Glucose    (Z12.4) Cervical cancer  "screening  Comment: screen  Plan: Pap Screen with HPV - recommended age 30 - 65         years, HPV Hold (Lab Only)    (Z12.31) Encounter for screening mammogram for breast cancer  Comment: screen  Plan: MA Screen Bilateral w/Pedro      Patient has been advised of split billing requirements and indicates understanding: Yes    Menorrhagia- check pelvic us.  No obviuos rectocele- discsused constipation  RA- referral rheum- has never been on meds  COUNSELING:  Reviewed preventive health counseling, as reflected in patient instructions      BMI:   Estimated body mass index is 41.46 kg/m  as calculated from the following:    Height as of this encounter: 1.676 m (5' 6\").    Weight as of this encounter: 116.5 kg (256 lb 14.4 oz).   Weight management plan: Discussed healthy diet and exercise guidelines      She reports that she has never smoked. She has never used smokeless tobacco.      Elizabeth Bradley NP  [facility]  "

## 2023-01-23 ENCOUNTER — LAB (OUTPATIENT)
Dept: LAB | Facility: CLINIC | Age: 43
End: 2023-01-23
Payer: COMMERCIAL

## 2023-01-23 DIAGNOSIS — E78.5 HYPERLIPIDEMIA LDL GOAL <130: ICD-10-CM

## 2023-01-23 DIAGNOSIS — N92.0 MENORRHAGIA WITH REGULAR CYCLE: ICD-10-CM

## 2023-01-23 DIAGNOSIS — Z13.1 ENCOUNTER FOR SCREENING EXAMINATION FOR IMPAIRED GLUCOSE REGULATION AND DIABETES MELLITUS: ICD-10-CM

## 2023-01-23 LAB
BKR LAB AP GYN ADEQUACY: NORMAL
BKR LAB AP GYN INTERPRETATION: NORMAL
BKR LAB AP HPV REFLEX: NORMAL
BKR LAB AP LMP: NORMAL
BKR LAB AP PREVIOUS ABNORMAL: NORMAL
CHOLEST SERPL-MCNC: 181 MG/DL
ERYTHROCYTE [DISTWIDTH] IN BLOOD BY AUTOMATED COUNT: 12.1 % (ref 10–15)
FASTING STATUS PATIENT QL REPORTED: YES
FERRITIN SERPL-MCNC: 44 NG/ML (ref 6–175)
GLUCOSE SERPL-MCNC: 92 MG/DL (ref 70–99)
HCT VFR BLD AUTO: 40.4 % (ref 35–47)
HDLC SERPL-MCNC: 55 MG/DL
HGB BLD-MCNC: 13.3 G/DL (ref 11.7–15.7)
LDLC SERPL CALC-MCNC: 109 MG/DL
MCH RBC QN AUTO: 31.3 PG (ref 26.5–33)
MCHC RBC AUTO-ENTMCNC: 32.9 G/DL (ref 31.5–36.5)
MCV RBC AUTO: 95 FL (ref 78–100)
NONHDLC SERPL-MCNC: 126 MG/DL
PATH REPORT.COMMENTS IMP SPEC: NORMAL
PATH REPORT.COMMENTS IMP SPEC: NORMAL
PATH REPORT.RELEVANT HX SPEC: NORMAL
PLATELET # BLD AUTO: 276 10E3/UL (ref 150–450)
RBC # BLD AUTO: 4.25 10E6/UL (ref 3.8–5.2)
TRIGL SERPL-MCNC: 84 MG/DL
TSH SERPL DL<=0.005 MIU/L-ACNC: 0.9 UIU/ML (ref 0.3–4.2)
WBC # BLD AUTO: 7.4 10E3/UL (ref 4–11)

## 2023-01-23 PROCEDURE — 36415 COLL VENOUS BLD VENIPUNCTURE: CPT

## 2023-01-23 PROCEDURE — 84443 ASSAY THYROID STIM HORMONE: CPT

## 2023-01-23 PROCEDURE — 85027 COMPLETE CBC AUTOMATED: CPT

## 2023-01-23 PROCEDURE — 82947 ASSAY GLUCOSE BLOOD QUANT: CPT

## 2023-01-23 PROCEDURE — 80061 LIPID PANEL: CPT

## 2023-01-23 PROCEDURE — 82728 ASSAY OF FERRITIN: CPT

## 2023-01-24 LAB
HUMAN PAPILLOMA VIRUS 16 DNA: NEGATIVE
HUMAN PAPILLOMA VIRUS 18 DNA: POSITIVE
HUMAN PAPILLOMA VIRUS FINAL DIAGNOSIS: ABNORMAL
HUMAN PAPILLOMA VIRUS OTHER HR: NEGATIVE

## 2023-01-25 ENCOUNTER — PATIENT OUTREACH (OUTPATIENT)
Dept: FAMILY MEDICINE | Facility: CLINIC | Age: 43
End: 2023-01-25
Payer: COMMERCIAL

## 2023-01-30 ENCOUNTER — HOSPITAL ENCOUNTER (OUTPATIENT)
Dept: MAMMOGRAPHY | Facility: CLINIC | Age: 43
Discharge: HOME | End: 2023-01-30
Attending: NURSE PRACTITIONER | Admitting: NURSE PRACTITIONER
Payer: COMMERCIAL

## 2023-01-30 DIAGNOSIS — Z12.31 ENCOUNTER FOR SCREENING MAMMOGRAM FOR BREAST CANCER: ICD-10-CM

## 2023-01-30 PROCEDURE — 77067 SCR MAMMO BI INCL CAD: CPT

## 2023-02-18 ENCOUNTER — HEALTH MAINTENANCE LETTER (OUTPATIENT)
Age: 43
End: 2023-02-18

## 2023-03-01 ENCOUNTER — HOSPITAL ENCOUNTER (OUTPATIENT)
Dept: ULTRASOUND IMAGING | Facility: CLINIC | Age: 43
Discharge: HOME | End: 2023-03-01
Attending: NURSE PRACTITIONER
Payer: COMMERCIAL

## 2023-03-01 DIAGNOSIS — R92.8 ABNORMAL MAMMOGRAM: ICD-10-CM

## 2023-03-01 PROCEDURE — 76642 ULTRASOUND BREAST LIMITED: CPT | Mod: LT

## 2023-03-13 ENCOUNTER — OFFICE VISIT (OUTPATIENT)
Dept: OBGYN | Facility: CLINIC | Age: 43
End: 2023-03-13
Payer: COMMERCIAL

## 2023-03-13 VITALS
WEIGHT: 250 LBS | SYSTOLIC BLOOD PRESSURE: 134 MMHG | OXYGEN SATURATION: 100 % | TEMPERATURE: 97.5 F | RESPIRATION RATE: 18 BRPM | BODY MASS INDEX: 40.18 KG/M2 | DIASTOLIC BLOOD PRESSURE: 88 MMHG | HEIGHT: 66 IN | HEART RATE: 110 BPM

## 2023-03-13 DIAGNOSIS — R87.810 CERVICAL HIGH RISK HPV (HUMAN PAPILLOMAVIRUS) TEST POSITIVE: Primary | ICD-10-CM

## 2023-03-13 LAB — HCG UR QL: NEGATIVE

## 2023-03-13 PROCEDURE — 88305 TISSUE EXAM BY PATHOLOGIST: CPT | Performed by: PATHOLOGY

## 2023-03-13 PROCEDURE — 57456 ENDOCERV CURETTAGE W/SCOPE: CPT | Performed by: OBSTETRICS & GYNECOLOGY

## 2023-03-13 PROCEDURE — 81025 URINE PREGNANCY TEST: CPT | Performed by: OBSTETRICS & GYNECOLOGY

## 2023-03-13 ASSESSMENT — PAIN SCALES - GENERAL: PAINLEVEL: NO PAIN (0)

## 2023-03-13 NOTE — PROGRESS NOTES
SUBJECTIVE:                                                   Hannah Pacheco is a 43 year old female who presents to clinic today for the following health issue(s):  Patient presents with:  Colposcopy      Additional information:     Normal pap,  HPV 18 on pap smear    Patient's last menstrual period was 2023..     Patient is sexually active, .    STD testing offered?  Declined      Today's PHQ-2 Score:   PHQ-2 (  [employer]) 2023   Q1: Little interest or pleasure in doing things 0   Q2: Feeling down, depressed or hopeless 0   PHQ-2 Score 0   Q1: Little interest or pleasure in doing things Not at all   Q2: Feeling down, depressed or hopeless Not at all   PHQ-2 Score 0     Today's PHQ-9 Score: No flowsheet data found.  Today's AILEEN-7 Score: No flowsheet data found.    Problem list and histories reviewed & adjusted, as indicated.  Additional history: as documented.    Patient Active Problem List   Diagnosis     Plantar fascial fibromatosis     Encounter for supervision of other normal pregnancy     PFO (patent foramen ovale)     History of sepsis- blood clot and septic PICC, PFO found.  requesting records as they are archieved.      Hyperlipidemia LDL goal <130     MARGARETTE III (cervical intraepithelial neoplasia grade III) with severe dysplasia     RA (rheumatoid arthritis) (H)     Chronic fatigue syndrome     Body mass index (BMI) greater than 30 in adult     Morbid obesity (H)     Past Surgical History:   Procedure Laterality Date     HC CONIZATION CERVIX,KNIFE/LASER  10/04/2005    Cold knife conization of cervix and endometrial biopsy.     TUBAL LIGATION        Social History     Tobacco Use     Smoking status: Never     Smokeless tobacco: Never     Tobacco comments:     no smokers in household   Substance Use Topics     Alcohol use: Yes     Alcohol/week: 0.0 standard drinks     Comment: wine cooler x2/3-4x per yr      Problem (# of Occurrences) Relation (Name,Age of Onset)    Respiratory (1)  Brother: Asthma       Negative family history of: Cancer, Diabetes, Heart Disease, Hypertension, Lipids            Current Outpatient Medications   Medication Sig     Naproxen Sodium (ALEVE PO) Take 220 mg by mouth as needed for moderate pain     No current facility-administered medications for this visit.     Allergies   Allergen Reactions     No Known Drug Allergies            OBJECTIVE:     Pulse 110   Temp 97.5  F (36.4  C) (Temporal)   Resp 18   Wt 113.4 kg (250 lb)   LMP 02/23/2023   SpO2 100%   BMI 40.35 kg/m    Body mass index is 40.35 kg/m .    Exam:  Colposcopy done in the usual fashion.  Acetic acid applied to cervix  No ecto cx lesions  Satisfactory transition zone    ECC done    In-Clinic Test Results:  No results found for this or any previous visit (from the past 24 hour(s)).    ASSESSMENT/PLAN:                                                        ICD-10-CM    1. Abnormal Pap smear of cervix  R87.619 HCG qualitative urine     HCG qualitative urine        Pending path, expect patient to have repeat pap in one year  David Nina MD  [facility]

## 2023-03-15 LAB
PATH REPORT.COMMENTS IMP SPEC: NORMAL
PATH REPORT.COMMENTS IMP SPEC: NORMAL
PATH REPORT.FINAL DX SPEC: NORMAL
PATH REPORT.GROSS SPEC: NORMAL
PATH REPORT.MICROSCOPIC SPEC OTHER STN: NORMAL
PATH REPORT.RELEVANT HX SPEC: NORMAL
PHOTO IMAGE: NORMAL

## 2023-03-17 ENCOUNTER — PATIENT OUTREACH (OUTPATIENT)
Dept: OBGYN | Facility: CLINIC | Age: 43
End: 2023-03-17
Payer: COMMERCIAL

## 2024-02-28 ENCOUNTER — ANCILLARY PROCEDURE (OUTPATIENT)
Dept: GENERAL RADIOLOGY | Facility: CLINIC | Age: 44
End: 2024-02-28
Attending: NURSE PRACTITIONER
Payer: COMMERCIAL

## 2024-02-28 ENCOUNTER — OFFICE VISIT (OUTPATIENT)
Dept: RHEUMATOLOGY | Facility: CLINIC | Age: 44
End: 2024-02-28
Payer: COMMERCIAL

## 2024-02-28 VITALS
OXYGEN SATURATION: 98 % | DIASTOLIC BLOOD PRESSURE: 84 MMHG | WEIGHT: 262.5 LBS | BODY MASS INDEX: 42.37 KG/M2 | SYSTOLIC BLOOD PRESSURE: 135 MMHG | HEART RATE: 98 BPM | TEMPERATURE: 97.9 F

## 2024-02-28 DIAGNOSIS — Z86.718 HISTORY OF BLOOD CLOTS: ICD-10-CM

## 2024-02-28 DIAGNOSIS — M79.89 CALF SWELLING: ICD-10-CM

## 2024-02-28 DIAGNOSIS — M25.50 POLYARTHRALGIA: ICD-10-CM

## 2024-02-28 DIAGNOSIS — R20.0 NUMBNESS AND TINGLING OF BOTH FEET: ICD-10-CM

## 2024-02-28 DIAGNOSIS — R76.8 SS-A ANTIBODY POSITIVE: Primary | ICD-10-CM

## 2024-02-28 DIAGNOSIS — E66.813 CLASS 3 SEVERE OBESITY WITHOUT SERIOUS COMORBIDITY WITH BODY MASS INDEX (BMI) OF 40.0 TO 44.9 IN ADULT, UNSPECIFIED OBESITY TYPE (H): ICD-10-CM

## 2024-02-28 DIAGNOSIS — R76.8 POSITIVE ANA (ANTINUCLEAR ANTIBODY): ICD-10-CM

## 2024-02-28 DIAGNOSIS — G47.9 SLEEP DISTURBANCE: ICD-10-CM

## 2024-02-28 DIAGNOSIS — M76.61 ACHILLES BURSITIS OF BOTH LOWER EXTREMITIES: ICD-10-CM

## 2024-02-28 DIAGNOSIS — Z87.39 HISTORY OF SPONDYLOARTHROPATHY: ICD-10-CM

## 2024-02-28 DIAGNOSIS — M76.62 ACHILLES BURSITIS OF BOTH LOWER EXTREMITIES: ICD-10-CM

## 2024-02-28 DIAGNOSIS — E66.01 CLASS 3 SEVERE OBESITY WITHOUT SERIOUS COMORBIDITY WITH BODY MASS INDEX (BMI) OF 40.0 TO 44.9 IN ADULT, UNSPECIFIED OBESITY TYPE (H): ICD-10-CM

## 2024-02-28 DIAGNOSIS — G89.4 CHRONIC PAIN SYNDROME: ICD-10-CM

## 2024-02-28 DIAGNOSIS — E55.9 VITAMIN D DEFICIENCY: ICD-10-CM

## 2024-02-28 DIAGNOSIS — R20.2 NUMBNESS AND TINGLING OF BOTH FEET: ICD-10-CM

## 2024-02-28 LAB
ALBUMIN SERPL BCG-MCNC: 4.3 G/DL (ref 3.5–5.2)
ALT SERPL W P-5'-P-CCNC: 7 U/L (ref 0–50)
AST SERPL W P-5'-P-CCNC: 19 U/L (ref 0–45)
CREAT SERPL-MCNC: 0.67 MG/DL (ref 0.51–0.95)
CRP SERPL-MCNC: 4.69 MG/L
EGFRCR SERPLBLD CKD-EPI 2021: >90 ML/MIN/1.73M2
ERYTHROCYTE [DISTWIDTH] IN BLOOD BY AUTOMATED COUNT: 12.4 % (ref 10–15)
ERYTHROCYTE [SEDIMENTATION RATE] IN BLOOD BY WESTERGREN METHOD: 2 MM/HR (ref 0–20)
HBA1C MFR BLD: 4.9 % (ref 0–5.6)
HCT VFR BLD AUTO: 40.6 % (ref 35–47)
HGB BLD-MCNC: 13.2 G/DL (ref 11.7–15.7)
MCH RBC QN AUTO: 31.1 PG (ref 26.5–33)
MCHC RBC AUTO-ENTMCNC: 32.5 G/DL (ref 31.5–36.5)
MCV RBC AUTO: 96 FL (ref 78–100)
PLATELET # BLD AUTO: 298 10E3/UL (ref 150–450)
RBC # BLD AUTO: 4.25 10E6/UL (ref 3.8–5.2)
TSH SERPL DL<=0.005 MIU/L-ACNC: 1.02 UIU/ML (ref 0.3–4.2)
WBC # BLD AUTO: 7.8 10E3/UL (ref 4–11)

## 2024-02-28 PROCEDURE — 86038 ANTINUCLEAR ANTIBODIES: CPT | Performed by: NURSE PRACTITIONER

## 2024-02-28 PROCEDURE — 86140 C-REACTIVE PROTEIN: CPT | Performed by: NURSE PRACTITIONER

## 2024-02-28 PROCEDURE — 86800 THYROGLOBULIN ANTIBODY: CPT | Performed by: NURSE PRACTITIONER

## 2024-02-28 PROCEDURE — 82040 ASSAY OF SERUM ALBUMIN: CPT | Performed by: NURSE PRACTITIONER

## 2024-02-28 PROCEDURE — 99205 OFFICE O/P NEW HI 60 MIN: CPT | Performed by: NURSE PRACTITIONER

## 2024-02-28 PROCEDURE — 85613 RUSSELL VIPER VENOM DILUTED: CPT | Performed by: NURSE PRACTITIONER

## 2024-02-28 PROCEDURE — 82306 VITAMIN D 25 HYDROXY: CPT | Performed by: NURSE PRACTITIONER

## 2024-02-28 PROCEDURE — 86431 RHEUMATOID FACTOR QUANT: CPT | Performed by: NURSE PRACTITIONER

## 2024-02-28 PROCEDURE — 85390 FIBRINOLYSINS SCREEN I&R: CPT | Performed by: PATHOLOGY

## 2024-02-28 PROCEDURE — 84450 TRANSFERASE (AST) (SGOT): CPT | Performed by: NURSE PRACTITIONER

## 2024-02-28 PROCEDURE — 99417 PROLNG OP E/M EACH 15 MIN: CPT | Performed by: NURSE PRACTITIONER

## 2024-02-28 PROCEDURE — 85730 THROMBOPLASTIN TIME PARTIAL: CPT | Performed by: NURSE PRACTITIONER

## 2024-02-28 PROCEDURE — 86235 NUCLEAR ANTIGEN ANTIBODY: CPT | Performed by: NURSE PRACTITIONER

## 2024-02-28 PROCEDURE — 85027 COMPLETE CBC AUTOMATED: CPT | Performed by: NURSE PRACTITIONER

## 2024-02-28 PROCEDURE — 72200 X-RAY EXAM SI JOINTS: CPT | Performed by: STUDENT IN AN ORGANIZED HEALTH CARE EDUCATION/TRAINING PROGRAM

## 2024-02-28 PROCEDURE — 84460 ALANINE AMINO (ALT) (SGPT): CPT | Performed by: NURSE PRACTITIONER

## 2024-02-28 PROCEDURE — 86376 MICROSOMAL ANTIBODY EACH: CPT | Performed by: NURSE PRACTITIONER

## 2024-02-28 PROCEDURE — 84443 ASSAY THYROID STIM HORMONE: CPT | Performed by: NURSE PRACTITIONER

## 2024-02-28 PROCEDURE — 86160 COMPLEMENT ANTIGEN: CPT | Performed by: NURSE PRACTITIONER

## 2024-02-28 PROCEDURE — 86146 BETA-2 GLYCOPROTEIN ANTIBODY: CPT | Performed by: NURSE PRACTITIONER

## 2024-02-28 PROCEDURE — 36415 COLL VENOUS BLD VENIPUNCTURE: CPT | Performed by: NURSE PRACTITIONER

## 2024-02-28 PROCEDURE — 82607 VITAMIN B-12: CPT | Performed by: NURSE PRACTITIONER

## 2024-02-28 PROCEDURE — 85652 RBC SED RATE AUTOMATED: CPT | Performed by: NURSE PRACTITIONER

## 2024-02-28 PROCEDURE — 83036 HEMOGLOBIN GLYCOSYLATED A1C: CPT | Performed by: NURSE PRACTITIONER

## 2024-02-28 PROCEDURE — 82565 ASSAY OF CREATININE: CPT | Performed by: NURSE PRACTITIONER

## 2024-02-28 RX ORDER — TRAZODONE HYDROCHLORIDE 50 MG/1
100 TABLET, FILM COATED ORAL AT BEDTIME
Qty: 180 TABLET | Refills: 1 | Status: SHIPPED | OUTPATIENT
Start: 2024-02-28

## 2024-02-28 ASSESSMENT — PAIN SCALES - GENERAL: PAINLEVEL: EXTREME PAIN (8)

## 2024-02-28 NOTE — PATIENT INSTRUCTIONS
1) When you see primary discuss treatment for chronic pain such as cymbalta as well as possible sleep study    2) Labs and xrays today    3) Schedule US and MRI    4) See podiatry    5) Start trazodone 1 hour before bed, start with 1 tab and if needed increase to 2 tabs    6) See me back 2-3 months to review results

## 2024-02-28 NOTE — PROGRESS NOTES
Rheumatology Clinic Visit  Eveline Trujillo CNP      Hannah Pacheco  YOB: 1980    Age: 44 year old   MRN# [id number]       Date of Visit: 02/28/2024  Primary care provider: No Ref-Primary, Physician              Subjective:     Hannah is a 44 year old female presents today for consult for hx of inflammatory arthritis. Referred by Dr. Elizabeth Bradley.       HPI: 2008 had hyperemesis during pregnancy, had PICC line, developed blood clot and then sepsis from a PICC line. Was in ICU for 1-2 weeks, hospitalized for over a month. With in 1 year after this and after delivery developed pain all over all the time. Does not remember joint swelling. Saw rheumatologist was dx with spondyloarthropathy and took SSZ, didn't take it for long.  Then stopped going. Had psychology/mental health factors of going to .   Now that getting older things are getting harder.   Currently symptoms are; wide spread pain, feels like a bruise all over, not limited to joints. Joints specifically intense ache, deep and sometimes feet stabbing. End of day swelling of feet ankles. Occ swelling of wrists.  Taking alleve daily x 2 weeks, takes edge off and helps with HA.   Everything is stiff in am. When she places her feet in am, it takes her a min to be able to bear weight.     ROS: Patient denies recent infections, fevers, DIPTI, weight loss, diarrhea, rashes, SOB, mouth sores,  SICCA, miscarriages, raynauds.   + R eye double vision, recent. This started 2 weeks ago, has eye appt next week. Feels like brain is swelling. HA every day in last 2 weeks.   + Numbness tingling in feet.   +Sick after eats sometimes, icky burps, heartburn.   + poor sleep, hard time fallen sleep and staying sleep. Doesn't think snores, has not had sleep apnea steady.   +Off and swelling in bl calves the last month with pain, L>R     Past Rheum hx:   Tx by [facility] Dr. Young for spondyloarthropathy in past as well as Dr. Les Skaggs     Past Rheum  tx:  SSZ    Social History  [place] lives with , 5 kids  Works, clean houses,  and administration  No tobacco,  smokes  No ETOH  Walks , yoga     FMH:  Mom-OA, thyroid  2 aunts with PSO    Active Problem list:  Patient Active Problem List    Diagnosis Date Noted    Morbid obesity (H) 01/19/2023     Priority: Medium    RA (rheumatoid arthritis) (H) 06/14/2016     Priority: Medium    Chronic fatigue syndrome 06/14/2016     Priority: Medium    PFO (patent foramen ovale) 01/15/2016     Priority: Medium    History of sepsis- blood clot and septic PICC, PFO found.  requesting records as they are archieved.  01/15/2016     Priority: Medium    Hyperlipidemia LDL goal <130 01/15/2016     Priority: Medium    Body mass index (BMI) greater than 30 in adult 11/14/2012     Priority: Medium    Encounter for supervision of other normal pregnancy 05/01/2006     Priority: Medium     Diagnosis updated by automated process. Provider to review and confirm.      MARGARETTE III (cervical intraepithelial neoplasia grade III) with severe dysplasia 09/05/2005     Priority: Medium     10/11/02 ASCUS  9/11/03 LSIL/CIN1  6/29/05 ASCUS/ + HR HPV (16)  8/15/05 colp: MARGARETTE 2  9/12/05 ECC: CIN3, negative margins  10/4/05 CKC: EMB WNL, ECC WNL, Cervix bx CIN3, negative margins  12/9/06, 11/16/07, 11/3/12, paps all NIL  1/15/16 Pap NIL, Neg HR HPV. First cotest since [facility]. Plan: repeat cotest in 12 mo.   04/08/17 Would consider patient to be lost to follow-up.  7/21/17 NIL Pap, Neg HPV. Plan cotest in 3 years.   1/19/23 NIL pap, + HR HPV # 18. Plan: Moran bef 4/19/23   3/13/23 Moran ECC: no MARGARETTE. Plan: cotest in 1 yr.   3/15/24 Appt. Notes added      Plantar fascial fibromatosis 09/11/2003     Priority: Medium            Objective:     Physical Exam  /84 (BP Location: Left arm, Patient Position: Sitting, Cuff Size: Adult Large)   Pulse 98   Temp 97.9  F (36.6  C) (Oral)   Wt 119.1 kg (262 lb 8 oz)   LMP 02/22/2024  (Approximate)   SpO2 98%   Breastfeeding No   BMI 42.37 kg/m    Body mass index is 42.37 kg/m .    Constitutional: Lg  body habitus with out gross deformities. Well groomed.   Psych: nl judgement, orientation, memory, affect.  Eyes: wnl EOM, PERRLA, vision, conjunctiva, sclera   ENT: wnl external ears, nose, hearing, lips, teeth, gums, throat. No mucous membrane lesions, normal saliva pool  Neck: no mass, thyroid enlargement, enlarged cervical lymph nodes or parotid glands  Resp: lungs clear to auscultation, nl work of breathing  CV: RRR, no murmurs, rubs or gallops, no edema    Skin: no nail pitting, alopecia, rash, nodules or lesions of exposed areas of face, neck, bilateral upper and lower extremity   Neuro: Strength WNL of bilateral upper and lower extremity large muscle groups.     MSK- (T=tender to palpation, S=swelling)  TMJ: -T/S, FROM   Cervical spine:  FROM  Shoulders: -T/S, FROM   Elbows: -T/S, FROM   Wrists: -T/S, FROM   Hands: -T/S, FROM, Normal  strength. No dactylitis.   Hips: FROM  Knees: -T/S, FROM   Ankles: Bl achilles TTP L>R, ? Swelling of R   Feet: -T/S, FROM . No dactylitis.       Labs:    Lab Results   Component Value Date    ALT 22 06/14/2016    AST 26 06/14/2016    CR 0.62 06/14/2016    CRP 10.9 (H) 06/14/2016   +AURORA  +SSA    Imaging:            Assessment and Plan:     1) Polyarthropathy: This is a 44 yr old female previously dx of spondyloarthropathy by Dr. Andrea SHEIKH with mild swelling in hand and feet joints documented in 2010, pt was started on SSZ and lost to Follow-up. Pt here today with an increase in wide spread diffuse pain and poor sleep as well as an increase in joint specific pain that is a deep ache. Reports occ swelling of wrists and ankles. Biggest complaint is achilles pain and swelling with significant pain in am. Achilles symptoms limits her ability to be active, pt reports this is her number one concern. In addition reports numbness and tingling of MTP pads.  "Exam finds bl achilles TTP and ? Swelling of R.  Labs show past: -RF,-CCP. +AURORA, +SSA.   Given past dx of spondy with question of enthesitis will do L ankle MRI and also refer pt to podiatry to explore non inflammatory causes.     2) +AURORA, +SSA: Question of lower peripheral neuropathy and upper GI symptoms with only being able to eat small meals and \"yucky burps\".  No SICCA complex. Will repeat labs. Consider referral to GI in future.     3) Chronic pain syndrome with poor sleep: Will start pt on Trazodone, recommend she discuss sleep study and cymbalta with PCP.     4) Calve swelling x 2 weeks in setting of hx of blood clot: Will do US to R/O DVT, check LAC, beta 2 glycoprotein. Previous work up otherwise for clotting.     5) Obesity: Discussed affect of wgt on joint health. Will check TSH, Hg A1C, B12, Vit D      Pt instructions:     1) When you see primary discuss treatment for chronic pain such as cymbalta as well as possible sleep study    2) Labs and xrays today    3) Schedule US and MRI    4) See podiatry    5) Start trazodone 1 hour before bed, start with 1 tab and if needed increase to 2 tabs    6) See me back 2-3 months to review results   Pt states understanding and agreement to plan of care, has no further questions.   75 minute spent on the date of the encounter doing chart review, history and exam, documentation and further activities per the note    Eveline Trujillo CNP  Rheumatology, [facility]      2/29/24:  Abnormal SI xray, will do MRI.   Low vit D 12. Will replace.   Eveline Trujillo NP          "

## 2024-02-28 NOTE — NURSING NOTE
"Hannah Pacheco's goals for this visit include:   Chief Complaint   Patient presents with    Consult     Rheumatoid arthritis with positive rheumatoid factor, involving unspecified site    New Patient     Referred by: Elizabeth Bradley NP       PCP: No Ref-Primary, Physician    Referring Provider:  No referring provider defined for this encounter.      Initial /84 (BP Location: Left arm, Patient Position: Sitting, Cuff Size: Adult Large)   Pulse 98   Temp 97.9  F (36.6  C) (Oral)   Wt 119.1 kg (262 lb 8 oz)   LMP 02/22/2024 (Approximate)   SpO2 98%   Breastfeeding No   BMI 42.37 kg/m   Estimated body mass index is 42.37 kg/m  as calculated from the following:    Height as of 3/13/23: 1.676 m (5' 6\").    Weight as of this encounter: 119.1 kg (262 lb 8 oz).    Medication Reconciliation: complete    Do you need any medication refills at today's visit? none    DEANNE Brown  Rheumatology/Infectious disease  [facility]   [phone]      "

## 2024-02-29 DIAGNOSIS — R93.7 ABNORMAL X-RAY OF BONE: Primary | ICD-10-CM

## 2024-02-29 LAB
ANA SER QL IF: NEGATIVE
B2 GLYCOPROT1 IGG SERPL IA-ACNC: 2 U/ML
B2 GLYCOPROT1 IGM SERPL IA-ACNC: 2.6 U/ML
C3 SERPL-MCNC: 136 MG/DL (ref 81–157)
C4 SERPL-MCNC: 25 MG/DL (ref 13–39)
DRVVT SCREEN RATIO: 0.86
ENA SM IGG SER IA-ACNC: 1.8 U/ML
ENA SM IGG SER IA-ACNC: NEGATIVE
ENA SS-A AB SER IA-ACNC: 0.6 U/ML
ENA SS-A AB SER IA-ACNC: NEGATIVE
ENA SS-B IGG SER IA-ACNC: 0.6 U/ML
ENA SS-B IGG SER IA-ACNC: NEGATIVE
INR PPP: 0.98 (ref 0.85–1.15)
LA PPP-IMP: NEGATIVE
LUPUS INTERPRETATION: NORMAL
PTT RATIO: 0.97
RHEUMATOID FACT SERPL-ACNC: <10 IU/ML
THROMBIN TIME: 16.7 SECONDS (ref 13–19)
THYROGLOB AB SERPL IA-ACNC: <20 IU/ML
THYROPEROXIDASE AB SERPL-ACNC: <10 IU/ML
U1 SNRNP IGG SER IA-ACNC: 2.4 U/ML
U1 SNRNP IGG SER IA-ACNC: NEGATIVE
VIT B12 SERPL-MCNC: 335 PG/ML (ref 232–1245)
VIT D+METAB SERPL-MCNC: 12 NG/ML (ref 20–50)

## 2024-02-29 RX ORDER — ERGOCALCIFEROL 1.25 MG/1
50000 CAPSULE, LIQUID FILLED ORAL
Qty: 12 CAPSULE | Refills: 0 | Status: SHIPPED | OUTPATIENT
Start: 2024-02-29

## 2024-03-08 SDOH — HEALTH STABILITY: PHYSICAL HEALTH: ON AVERAGE, HOW MANY MINUTES DO YOU ENGAGE IN EXERCISE AT THIS LEVEL?: 120 MIN

## 2024-03-08 SDOH — HEALTH STABILITY: PHYSICAL HEALTH: ON AVERAGE, HOW MANY DAYS PER WEEK DO YOU ENGAGE IN MODERATE TO STRENUOUS EXERCISE (LIKE A BRISK WALK)?: 3 DAYS

## 2024-03-08 ASSESSMENT — SOCIAL DETERMINANTS OF HEALTH (SDOH): HOW OFTEN DO YOU GET TOGETHER WITH FRIENDS OR RELATIVES?: ONCE A WEEK

## 2024-03-08 NOTE — COMMUNITY RESOURCES LIST (ENGLISH)
03/08/2024   [facility]  N/A  For questions about this resource list or additional care needs, please contact your primary care clinic or care manager.  Phone: [phone]   Email: N/A   Address: [address] [place] [address]   Hours: N/A        Housing       Coordinated Entry access point  1  [facility] ([facility]) - Coordinated Entry List Assessments Distance: 8.23 miles      Phone/Virtual   [address] [place] [address]  Language: English  Hours: Mon - Wed 8:00 AM - 4:30 PM , Fri 8:00 AM - 4:30 PM , Fri 8:00 AM - 4:30 PM  Fees: Free   Phone: (409) 538-1655 Email: general@[employer] Website: http://www.[employer]     2  [facility] Distance: 8.85 miles      Phone/Virtual   [address] [place] [address]  Language: English  Hours: Mon - Sun Open 24 Hours  Fees: Free   Phone: (978) 596-6644 Website: http://www.[facility].net     Drop-in center or day shelter  3  [facility] Community Support Program - [facility] Distance: 5.37 miles      In-Person   [address] [place] [address]  Language: English  Hours: Mon - Thu 9:00 AM - 3:00 PM  Fees: Free   Phone: (439) 996-8021 Email: chico@[facility].org Website: http://[facility].org/services/mental-health/[place]-community-support-program/     Shelter for families  4  [facility] Distance: 3.19 miles      In-Person   [address] [place] [address]  Language: English  Hours: Mon - Fri 8:30 AM - 4:00 PM  Fees: Free, Self Pay   Phone: (162) 515-4774 Email: jazz@[facility].org Website: http://[facility].org/community/"[employer]/     5  [facility] Distance: 3.91 miles      In-Person   [address] [place] [address]  Language: English  Hours: Mon - Sun Open 24 Hours  Fees: Free   Phone: (401) 728-5053 Email: Forever@[employer] Website:  https://www.[employer]/     Shelter for individuals  6  [facility]. [facility] Distance: 3.19 miles      In-Person   [address] [place] [address]  Language: English  Hours: Mon - Fri 8:30 AM - 4:00 PM  Fees: Free   Phone: (519) 916-4405 Email: jazz@[facility].[employer] Website: http://[facility].org/community/"[employer]/     7  [facility] [place] Distance: 3.91 miles      In-Person   [address] [place] [address]  Language: English  Hours: Mon - Sun Open 24 Hours  Fees: Free   Phone: (350) 891-8906 Email: lolita@[employer] Website: https://www.[employer]/          Important Numbers & Websites       Emergency Services   911  [facility]   311  Poison Control   (168) 589-4435  Suicide Prevention Lifeline   (288) 712-9999 (TALK)  Child Abuse Hotline   (405) 677-7773 (4-A-Child)  Sexual Assault Hotline   (957) 816-3420 (HOPE)  National Runaway Safeline   (392) 313-5523 (RUNAWAY)  All-Options Talkline   (378) 529-8034  Substance Abuse Referral   (910) 242-9570 (HELP)

## 2024-03-11 ENCOUNTER — ANCILLARY PROCEDURE (OUTPATIENT)
Dept: GENERAL RADIOLOGY | Facility: CLINIC | Age: 44
End: 2024-03-11
Attending: PODIATRIST
Payer: COMMERCIAL

## 2024-03-11 ENCOUNTER — OFFICE VISIT (OUTPATIENT)
Dept: PODIATRY | Facility: CLINIC | Age: 44
End: 2024-03-11
Attending: NURSE PRACTITIONER
Payer: COMMERCIAL

## 2024-03-11 VITALS
SYSTOLIC BLOOD PRESSURE: 132 MMHG | HEIGHT: 66 IN | WEIGHT: 258 LBS | DIASTOLIC BLOOD PRESSURE: 82 MMHG | BODY MASS INDEX: 41.46 KG/M2

## 2024-03-11 DIAGNOSIS — M76.62 ACHILLES BURSITIS OF BOTH LOWER EXTREMITIES: ICD-10-CM

## 2024-03-11 DIAGNOSIS — M76.61 ACHILLES TENDINITIS OF BOTH LOWER EXTREMITIES: ICD-10-CM

## 2024-03-11 DIAGNOSIS — M72.2 PLANTAR FASCIAL FIBROMATOSIS: Primary | ICD-10-CM

## 2024-03-11 DIAGNOSIS — M76.61 ACHILLES BURSITIS OF BOTH LOWER EXTREMITIES: ICD-10-CM

## 2024-03-11 DIAGNOSIS — M76.62 ACHILLES TENDINITIS OF BOTH LOWER EXTREMITIES: ICD-10-CM

## 2024-03-11 PROCEDURE — 73630 X-RAY EXAM OF FOOT: CPT | Mod: TC | Performed by: RADIOLOGY

## 2024-03-11 PROCEDURE — 99203 OFFICE O/P NEW LOW 30 MIN: CPT | Performed by: PODIATRIST

## 2024-03-11 ASSESSMENT — PAIN SCALES - GENERAL: PAINLEVEL: MODERATE PAIN (4)

## 2024-03-11 NOTE — PATIENT INSTRUCTIONS
Reliable shoe stores: To maximize your experience and provide the best possible fit.  Be sure to show them your foot concerns and tell them Dr. Mcclendon sent you.      Stores listed in bold have only athletic shoes, and stores that are not bold are mostly casual or variety of shoes    [employer]  [address] [place] [address]  [phone]    [employer]  [address] [place] [address]  [phone]     [employer]  [address] [place] [address]  [phone]    [employer]  [address] [facility]  [facility] [address]  [phone]    [employer]  [address] [place], [place] [address] [phone]    [employer]  [address] [place] [address]  [phone]                         [employer] Locations:     [address] [place] [address]   [phone]     [address] [place] [address] [address] [place] [address]   [phone]     [address] [place] [address]   [phone]     [address] [facility] [place] [address]   [phone]     [address] [place] [address]   [phone]     [address] [place] [address]   [phone]     [address]   [place] [address]   [phone]     [address]   [place] [address]   [phone]             How to find reasonable shoes          The correct width    Correct Fitting    Correct Length      Foot Distortion    Posture Distortion                          Torsional Rigidity      Grasp behind the heel and underneath the foot and twist      Bad    Excessive torsion/twist in midfoot     Less torsion/twist in midfoot is better                   Heel Counter Rigidity      Grasp just above   midsole and squeeze      Bad    Soft heel counter      Good    Rigid Heel Counter      Flexion Rigidity      Grasp shoe and bend from forefoot to rearfoot

## 2024-03-11 NOTE — LETTER
"    3/11/2024         RE: Hannah Pacheco  [address]  [place] [address]        Dear Colleague,    Thank you for referring your patient, Hannah Pacheco, to the [facility]. Please see a copy of my visit note below.    HPI:  Hannah Pacheco is a 44 year old female who is seen in consultation at the request of Rheumatology - Eveline Trujillo NP    Pt presents for eval of:   (Onset, Location, L/R, Character, Treatments, Injury if yes)    XR Left and Right foot 3/11/2024     Onset Summer 2023, plantar Left > Right heel pain that radiates to posterior achilles.  No injury noted. Intermittent numbness plantar ball of foot Left > Right, tingling toes.  Constant dull ache. Intermittent sharp pain 4-8/10. \"Electrical\" pain when dorsal Left > Right.  Tightness with first steps after lying or sitting that calms down after walking for a few minutes.    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    ROS:  10 point ROS neg other than the symptoms noted above in the HPI.    Patient Active Problem List   Diagnosis     Plantar fascial fibromatosis     Encounter for supervision of other normal pregnancy     PFO (patent foramen ovale)     History of sepsis- blood clot and septic PICC, PFO found.  requesting records as they are archieved.      Hyperlipidemia LDL goal <130     MARGARETTE III (cervical intraepithelial neoplasia grade III) with severe dysplasia     RA (rheumatoid arthritis) (H)     Chronic fatigue syndrome     Body mass index (BMI) greater than 30 in adult     Morbid obesity (H)       PAST MEDICAL HISTORY:   Past Medical History:   Diagnosis Date     MARGARETTE III (cervical intraepithelial neoplasia grade III) with severe dysplasia 05    CKC, negative margins     NONSPECIFIC MEDICAL HISTORY     HPV/10-02     Normal delivery          Sepsis (H)      Stroke (H)      Varicella without " mention of complication     Chickenpox        PAST SURGICAL HISTORY:   Past Surgical History:   Procedure Laterality Date     HC CONIZATION CERVIX,KNIFE/LASER  10/04/2005    Cold knife conization of cervix and endometrial biopsy.     TUBAL LIGATION          MEDICATIONS:   Current Outpatient Medications:      Naproxen Sodium (ALEVE PO), Take 220 mg by mouth as needed for moderate pain, Disp: , Rfl:      traZODone (DESYREL) 50 MG tablet, Take 2 tablets (100 mg) by mouth at bedtime Start with 1 tablet at night and increase to 2 tablets if needed, Disp: 180 tablet, Rfl: 1     vitamin D2 (ERGOCALCIFEROL) 73025 units (1250 mcg) capsule, Take 1 capsule (50,000 Units) by mouth every 7 days, Disp: 12 capsule, Rfl: 0     ALLERGIES:    Allergies   Allergen Reactions     Blood-Group Specific Substance      Patient has Anti-Tomahawk       Patient has a Nonspecific Antibody         Blood Product orders may be delayed.  Draw one red top and two purple top tubes for ALL Type and Screen/ Type and Crossmatch orders.     No Known Drug Allergy         SOCIAL HISTORY:   Social History     Socioeconomic History     Marital status:      Spouse name: Not on file     Number of children: 1     Years of education: 13     Highest education level: Not on file   Occupational History     Occupation: nursing ass.     Occupation: teachers aid     Occupation: care ass.   Tobacco Use     Smoking status: Never     Smokeless tobacco: Never     Tobacco comments:     no smokers in household   Substance and Sexual Activity     Alcohol use: Yes     Alcohol/week: 0.0 standard drinks of alcohol     Comment: wine cooler x2/3-4x per yr     Drug use: No     Sexual activity: Yes     Partners: Male     Comment: tubal   Other Topics Concern      Service No     Blood Transfusions No     Caffeine Concern No     Occupational Exposure No     Hobby Hazards No     Sleep Concern Yes     Comment: hard time falling asleep, stays asleep well     Stress Concern No      Weight Concern No     Comment: Has lost 30 lbs since her last physical, intentional     Special Diet Not Asked     Back Care Not Asked     Exercise Yes     Comment: Walking     Bike Helmet No     Seat Belt Yes     Self-Exams No     Parent/sibling w/ CABG, MI or angioplasty before 65F 55M? Not Asked   Social History Narrative    One son named Arley     Social Determinants of Health     Financial Resource Strain: Low Risk  (3/8/2024)    Financial Resource Strain      Within the past 12 months, have you or your family members you live with been unable to get utilities (heat, electricity) when it was really needed?: No   Food Insecurity: Low Risk  (3/8/2024)    Food Insecurity      Within the past 12 months, did you worry that your food would run out before you got money to buy more?: No      Within the past 12 months, did the food you bought just not last and you didn t have money to get more?: No   Transportation Needs: Low Risk  (3/8/2024)    Transportation Needs      Within the past 12 months, has lack of transportation kept you from medical appointments, getting your medicines, non-medical meetings or appointments, work, or from getting things that you need?: No   Physical Activity: Sufficiently Active (3/8/2024)    Exercise Vital Sign      Days of Exercise per Week: 3 days      Minutes of Exercise per Session: 120 min   Stress: Stress Concern Present (3/8/2024)    [place] [employer] - Occupational Stress Questionnaire      Feeling of Stress : To some extent   Social Connections: Unknown (3/8/2024)    Social Connection and Isolation Panel [NHANES]      Frequency of Communication with Friends and Family: Not on file      Frequency of Social Gatherings with Friends and Family: Once a week      Attends Mandaeism Services: Not on file      Active Member of Clubs or Organizations: Not on file      Attends Club or Organization Meetings: Not on file      Marital Status: Not on file   Interpersonal  "Safety: Not on file   Housing Stability: High Risk (3/8/2024)    Housing Stability      Do you have housing? : No      Are you worried about losing your housing?: No        FAMILY HISTORY:   Family History   Problem Relation Age of Onset     Respiratory Brother         Asthma     Cancer No family hx of      Diabetes No family hx of      Heart Disease No family hx of      Hypertension No family hx of      Lipids No family hx of         EXAM:Vitals: /82 (BP Location: Left arm, Patient Position: Sitting, Cuff Size: Adult Large)   Ht 1.685 m (5' 6.34\")   Wt 117 kg (258 lb)   LMP 02/22/2024 (Approximate)   BMI 41.22 kg/m    BMI= Body mass index is 41.22 kg/m .    General appearance: Patient is alert and fully cooperative with history & exam.  No sign of distress is noted during the visit.     Psychiatric: Affect is pleasant & appropriate.  Patient appears motivated to improve health.     Respiratory: Breathing is regular & unlabored while sitting.     HEENT: Hearing is intact to spoken word.  Speech is clear.  No gross evidence of visual impairment that would impact ambulation.     Vascular: DP & PT pulses are intact & regular bilaterally.  No significant edema or varicosities noted.  CFT and skin temperature is normal to both lower extremities.     Neurologic: Lower extremity sensation is intact to light touch.  No evidence of weakness or contracture in the lower extremities.  No evidence of neuropathy.    Dermatologic: Skin is intact to both lower extremities with adequate texture, turgor and tone about the integument.  No paronychia or evidence of soft tissue infection is noted.     Musculoskeletal: Patient is ambulatory without assistive device or brace.  Prominence is noted with firm palpation along the medial band of the plantar fascia and insertion of the Achilles tendon left greater than right.  About 0 degrees of ankle joint dorsiflexion noted bilateral.  Patient is obese.  No painful or limited range " of motion otherwise through her ankle subtalar midtarsal metatarsal phalangeal joints no crepitus.  Manual muscle strength is 5/5 to all 4 quadrants.    Radiographs: 3 views bilateral demonstrate large plantar calcaneal osteophyte consistent with plantar fasciitis.  Also a small posterior calcaneal osteophyte consistent with insertional Achilles tendinitis.     ASSESSMENT:       ICD-10-CM    1. Plantar fascial fibromatosis  M72.2 Orthotics, Mastectomy and Custom Compression Orders      2. Achilles bursitis of both lower extremities  M76.61 Orthopedic  Referral    M76.62 Orthotics, Mastectomy and Custom Compression Orders           PLAN:  Reviewed patient's chart in [facility].      3/11/2024       Obtained and interpreted radiographs  Discussed plantar fasciitis and Achilles tendinitis.  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         Hari Mcclendon DPM      Again, thank you for allowing me to participate in the care of your patient.        Sincerely,        Hari Mcclendon DPM

## 2024-03-11 NOTE — NURSING NOTE
Dispensed 1 Dorsal (Anterior) Night Splint, Size S/M, with [facility] agreement signed by patient. Josephine Robert CMA, March 11, 2024

## 2024-03-11 NOTE — PROGRESS NOTES
"HPI:  Hannah Pacheco is a 44 year old female who is seen in consultation at the request of Rheumatology - Eveline Trujillo NP    Pt presents for eval of:   (Onset, Location, L/R, Character, Treatments, Injury if yes)    XR Left and Right foot 3/11/2024     Onset Summer 2023, plantar Left > Right heel pain that radiates to posterior achilles.  No injury noted. Intermittent numbness plantar ball of foot Left > Right, tingling toes.  Constant dull ache. Intermittent sharp pain -8/10. \"Electrical\" pain when dorsal Left > Right.  Tightness with first steps after lying or sitting that calms down after walking for a few minutes.    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    ROS:  10 point ROS neg other than the symptoms noted above in the HPI.    Patient Active Problem List   Diagnosis    Plantar fascial fibromatosis    Encounter for supervision of other normal pregnancy    PFO (patent foramen ovale)    History of sepsis- blood clot and septic PICC, PFO found.  requesting records as they are archieved.     Hyperlipidemia LDL goal <130    MARGARETTE III (cervical intraepithelial neoplasia grade III) with severe dysplasia    RA (rheumatoid arthritis) (H)    Chronic fatigue syndrome    Body mass index (BMI) greater than 30 in adult    Morbid obesity (H)       PAST MEDICAL HISTORY:   Past Medical History:   Diagnosis Date    MARGARETTE III (cervical intraepithelial neoplasia grade III) with severe dysplasia 05    CKC, negative margins    NONSPECIFIC MEDICAL HISTORY     HPV/10-02    Normal delivery         Sepsis (H)     Stroke (H)     Varicella without mention of complication     Chickenpox        PAST SURGICAL HISTORY:   Past Surgical History:   Procedure Laterality Date    HC CONIZATION CERVIX,KNIFE/LASER  10/04/2005    Cold knife conization of cervix and endometrial biopsy.    TUBAL LIGATION          MEDICATIONS: "   Current Outpatient Medications:     Naproxen Sodium (ALEVE PO), Take 220 mg by mouth as needed for moderate pain, Disp: , Rfl:     traZODone (DESYREL) 50 MG tablet, Take 2 tablets (100 mg) by mouth at bedtime Start with 1 tablet at night and increase to 2 tablets if needed, Disp: 180 tablet, Rfl: 1    vitamin D2 (ERGOCALCIFEROL) 93875 units (1250 mcg) capsule, Take 1 capsule (50,000 Units) by mouth every 7 days, Disp: 12 capsule, Rfl: 0     ALLERGIES:    Allergies   Allergen Reactions    Blood-Group Specific Substance      Patient has Anti-Aura       Patient has a Nonspecific Antibody         Blood Product orders may be delayed.  Draw one red top and two purple top tubes for ALL Type and Screen/ Type and Crossmatch orders.    No Known Drug Allergy         SOCIAL HISTORY:   Social History     Socioeconomic History    Marital status:      Spouse name: Not on file    Number of children: 1    Years of education: 13    Highest education level: Not on file   Occupational History    Occupation: nursing ass.    Occupation: teachers aid    Occupation: care ass.   Tobacco Use    Smoking status: Never    Smokeless tobacco: Never    Tobacco comments:     no smokers in household   Substance and Sexual Activity    Alcohol use: Yes     Alcohol/week: 0.0 standard drinks of alcohol     Comment: wine cooler x2/3-4x per yr    Drug use: No    Sexual activity: Yes     Partners: Male     Comment: tubal   Other Topics Concern     Service No    Blood Transfusions No    Caffeine Concern No    Occupational Exposure No    Hobby Hazards No    Sleep Concern Yes     Comment: hard time falling asleep, stays asleep well    Stress Concern No    Weight Concern No     Comment: Has lost 30 lbs since her last physical, intentional    Special Diet Not Asked    Back Care Not Asked    Exercise Yes     Comment: Walking    Bike Helmet No    Seat Belt Yes    Self-Exams No    Parent/sibling w/ CABG, MI or angioplasty before 65F 55M? Not Asked    Social History Narrative    One son named Arley     Social Determinants of Health     Financial Resource Strain: Low Risk  (3/8/2024)    Financial Resource Strain     Within the past 12 months, have you or your family members you live with been unable to get utilities (heat, electricity) when it was really needed?: No   Food Insecurity: Low Risk  (3/8/2024)    Food Insecurity     Within the past 12 months, did you worry that your food would run out before you got money to buy more?: No     Within the past 12 months, did the food you bought just not last and you didn t have money to get more?: No   Transportation Needs: Low Risk  (3/8/2024)    Transportation Needs     Within the past 12 months, has lack of transportation kept you from medical appointments, getting your medicines, non-medical meetings or appointments, work, or from getting things that you need?: No   Physical Activity: Sufficiently Active (3/8/2024)    Exercise Vital Sign     Days of Exercise per Week: 3 days     Minutes of Exercise per Session: 120 min   Stress: Stress Concern Present (3/8/2024)    [employer] [place] [employer] - Occupational Stress Questionnaire     Feeling of Stress : To some extent   Social Connections: Unknown (3/8/2024)    Social Connection and Isolation Panel [NHANES]     Frequency of Communication with Friends and Family: Not on file     Frequency of Social Gatherings with Friends and Family: Once a week     Attends Anglican Services: Not on file     Active Member of Clubs or Organizations: Not on file     Attends Club or Organization Meetings: Not on file     Marital Status: Not on file   Interpersonal Safety: Not on file   Housing Stability: High Risk (3/8/2024)    Housing Stability     Do you have housing? : No     Are you worried about losing your housing?: No        FAMILY HISTORY:   Family History   Problem Relation Age of Onset    Respiratory Brother         Asthma    Cancer No family hx of     Diabetes No  "family hx of     Heart Disease No family hx of     Hypertension No family hx of     Lipids No family hx of         EXAM:Vitals: /82 (BP Location: Left arm, Patient Position: Sitting, Cuff Size: Adult Large)   Ht 1.685 m (5' 6.34\")   Wt 117 kg (258 lb)   LMP 02/22/2024 (Approximate)   BMI 41.22 kg/m    BMI= Body mass index is 41.22 kg/m .    General appearance: Patient is alert and fully cooperative with history & exam.  No sign of distress is noted during the visit.     Psychiatric: Affect is pleasant & appropriate.  Patient appears motivated to improve health.     Respiratory: Breathing is regular & unlabored while sitting.     HEENT: Hearing is intact to spoken word.  Speech is clear.  No gross evidence of visual impairment that would impact ambulation.     Vascular: DP & PT pulses are intact & regular bilaterally.  No significant edema or varicosities noted.  CFT and skin temperature is normal to both lower extremities.     Neurologic: Lower extremity sensation is intact to light touch.  No evidence of weakness or contracture in the lower extremities.  No evidence of neuropathy.    Dermatologic: Skin is intact to both lower extremities with adequate texture, turgor and tone about the integument.  No paronychia or evidence of soft tissue infection is noted.     Musculoskeletal: Patient is ambulatory without assistive device or brace.  Prominence is noted with firm palpation along the medial band of the plantar fascia and insertion of the Achilles tendon left greater than right.  About 0 degrees of ankle joint dorsiflexion noted bilateral.  Patient is obese.  No painful or limited range of motion otherwise through her ankle subtalar midtarsal metatarsal phalangeal joints no crepitus.  Manual muscle strength is 5/5 to all 4 quadrants.    Radiographs: 3 views bilateral demonstrate large plantar calcaneal osteophyte consistent with plantar fasciitis.  Also a small posterior calcaneal osteophyte consistent " with insertional Achilles tendinitis.     ASSESSMENT:       ICD-10-CM    1. Plantar fascial fibromatosis  M72.2 Orthotics, Mastectomy and Custom Compression Orders      2. Achilles bursitis of both lower extremities  M76.61 Orthopedic  Referral    M76.62 Orthotics, Mastectomy and Custom Compression Orders           PLAN:  Reviewed patient's chart in [facility].      3/11/2024       Obtained and interpreted radiographs  Discussed plantar fasciitis and Achilles tendinitis.  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         Hari Mcclendon DPM

## 2024-03-13 ENCOUNTER — HOSPITAL ENCOUNTER (OUTPATIENT)
Dept: ULTRASOUND IMAGING | Facility: CLINIC | Age: 44
Discharge: HOME | End: 2024-03-13
Attending: NURSE PRACTITIONER
Payer: COMMERCIAL

## 2024-03-13 ENCOUNTER — HOSPITAL ENCOUNTER (OUTPATIENT)
Dept: MRI IMAGING | Facility: CLINIC | Age: 44
Discharge: HOME | End: 2024-03-13
Attending: NURSE PRACTITIONER
Payer: COMMERCIAL

## 2024-03-13 DIAGNOSIS — M76.62 ACHILLES BURSITIS OF BOTH LOWER EXTREMITIES: ICD-10-CM

## 2024-03-13 DIAGNOSIS — Z87.39 HISTORY OF SPONDYLOARTHROPATHY: ICD-10-CM

## 2024-03-13 DIAGNOSIS — M76.61 ACHILLES BURSITIS OF BOTH LOWER EXTREMITIES: ICD-10-CM

## 2024-03-13 DIAGNOSIS — R93.7 ABNORMAL X-RAY OF BONE: ICD-10-CM

## 2024-03-13 DIAGNOSIS — Z86.718 HISTORY OF BLOOD CLOTS: ICD-10-CM

## 2024-03-13 DIAGNOSIS — M79.89 CALF SWELLING: ICD-10-CM

## 2024-03-13 DIAGNOSIS — M25.50 POLYARTHRALGIA: ICD-10-CM

## 2024-03-13 PROCEDURE — A9585 GADOBUTROL INJECTION: HCPCS | Performed by: NURSE PRACTITIONER

## 2024-03-13 PROCEDURE — 72197 MRI PELVIS W/O & W/DYE: CPT

## 2024-03-13 PROCEDURE — 93970 EXTREMITY STUDY: CPT

## 2024-03-13 PROCEDURE — 73721 MRI JNT OF LWR EXTRE W/O DYE: CPT | Mod: LT

## 2024-03-13 PROCEDURE — 255N000002 HC RX 255 OP 636: Performed by: NURSE PRACTITIONER

## 2024-03-13 PROCEDURE — 72197 MRI PELVIS W/O & W/DYE: CPT | Mod: 26 | Performed by: RADIOLOGY

## 2024-03-13 PROCEDURE — 73721 MRI JNT OF LWR EXTRE W/O DYE: CPT | Mod: 26 | Performed by: RADIOLOGY

## 2024-03-13 RX ORDER — GADOBUTROL 604.72 MG/ML
10 INJECTION INTRAVENOUS ONCE
Status: COMPLETED | OUTPATIENT
Start: 2024-03-13 | End: 2024-03-13

## 2024-03-13 RX ADMIN — GADOBUTROL 10 ML: 604.72 INJECTION INTRAVENOUS at 15:09

## 2024-03-15 ENCOUNTER — OFFICE VISIT (OUTPATIENT)
Dept: FAMILY MEDICINE | Facility: CLINIC | Age: 44
End: 2024-03-15
Payer: COMMERCIAL

## 2024-03-15 ENCOUNTER — HOSPITAL ENCOUNTER (OUTPATIENT)
Dept: MAMMOGRAPHY | Facility: CLINIC | Age: 44
Discharge: HOME | End: 2024-03-15
Admitting: NURSE PRACTITIONER
Payer: COMMERCIAL

## 2024-03-15 VITALS
BODY MASS INDEX: 41.78 KG/M2 | WEIGHT: 260 LBS | SYSTOLIC BLOOD PRESSURE: 124 MMHG | RESPIRATION RATE: 18 BRPM | OXYGEN SATURATION: 99 % | HEIGHT: 66 IN | TEMPERATURE: 97.5 F | HEART RATE: 71 BPM | DIASTOLIC BLOOD PRESSURE: 80 MMHG

## 2024-03-15 DIAGNOSIS — Z00.00 ROUTINE GENERAL MEDICAL EXAMINATION AT A HEALTH CARE FACILITY: Primary | ICD-10-CM

## 2024-03-15 DIAGNOSIS — Z12.31 VISIT FOR SCREENING MAMMOGRAM: ICD-10-CM

## 2024-03-15 DIAGNOSIS — Z12.4 CERVICAL CANCER SCREENING: ICD-10-CM

## 2024-03-15 DIAGNOSIS — M05.9 RHEUMATOID ARTHRITIS WITH POSITIVE RHEUMATOID FACTOR, INVOLVING UNSPECIFIED SITE (H): ICD-10-CM

## 2024-03-15 DIAGNOSIS — E66.01 MORBID OBESITY (H): ICD-10-CM

## 2024-03-15 DIAGNOSIS — E78.5 HYPERLIPIDEMIA LDL GOAL <130: ICD-10-CM

## 2024-03-15 DIAGNOSIS — K13.0 MUCOCELE OF LOWER LIP: ICD-10-CM

## 2024-03-15 PROCEDURE — 90471 IMMUNIZATION ADMIN: CPT | Performed by: NURSE PRACTITIONER

## 2024-03-15 PROCEDURE — 99213 OFFICE O/P EST LOW 20 MIN: CPT | Mod: 25 | Performed by: NURSE PRACTITIONER

## 2024-03-15 PROCEDURE — 77063 BREAST TOMOSYNTHESIS BI: CPT

## 2024-03-15 PROCEDURE — 90746 HEPB VACCINE 3 DOSE ADULT IM: CPT | Performed by: NURSE PRACTITIONER

## 2024-03-15 PROCEDURE — 90472 IMMUNIZATION ADMIN EACH ADD: CPT | Performed by: NURSE PRACTITIONER

## 2024-03-15 PROCEDURE — 87624 HPV HI-RISK TYP POOLED RSLT: CPT | Performed by: NURSE PRACTITIONER

## 2024-03-15 PROCEDURE — 99396 PREV VISIT EST AGE 40-64: CPT | Mod: 25 | Performed by: NURSE PRACTITIONER

## 2024-03-15 PROCEDURE — 90715 TDAP VACCINE 7 YRS/> IM: CPT | Performed by: NURSE PRACTITIONER

## 2024-03-15 PROCEDURE — G0145 SCR C/V CYTO,THINLAYER,RESCR: HCPCS | Performed by: NURSE PRACTITIONER

## 2024-03-15 ASSESSMENT — PAIN SCALES - GENERAL: PAINLEVEL: NO PAIN (0)

## 2024-03-15 NOTE — Clinical Note
Hi- pics of mucocele in chart- do you take these off?  She would like removed.  Thanks- Elizabeth Bradley, CNP

## 2024-03-15 NOTE — PROGRESS NOTES
"Preventive Care Visit  [facility]  Elizabeth Bradley NP, Nurse Practitioner - Family  Mar 15, 2024      Assessment & Plan     Routine general medical examination at a health care facility  Recommend yearly physicals    Rheumatoid arthritis with positive rheumatoid factor, involving unspecified site (H)  Following with Rheumatology- may be changing diagnosis.  Consider cymbalta in future as they recommended but she really wants to try weight loss first.  Discussed cymbalta may cause weight gain.      Morbid obesity (H)  Referral weight management clinic.  For medical management.  Discussed lifestyle and dietary changes  - Adult Comprehensive Weight Management  Referral; Future    Hyperlipidemia LDL goal <130  screen  - Lipid panel reflex to direct LDL Non-fasting; Future    Mucocele of lower lip  Would like removal- referral general surgery.      Cervical cancer screening  screen  - Pap Screen with HPV - recommended age 30 - 65 years    Patient has been advised of split billing requirements and indicates understanding: Yes    35 minutes spent by me on the date of the encounter doing chart review, review of test results, interpretation of tests, patient visit, and documentation       BMI  Estimated body mass index is 41.97 kg/m  as calculated from the following:    Height as of this encounter: 1.676 m (5' 6\").    Weight as of this encounter: 117.9 kg (260 lb).   Weight management plan: Discussed healthy diet and exercise guidelines    Counseling  Appropriate preventive services were discussed with this patient, including applicable screening as appropriate for fall prevention, nutrition, physical activity, Tobacco-use cessation, weight loss and cognition.  Checklist reviewing preventive services available has been given to the patient.  Reviewed patient's diet, addressing concerns and/or questions.   She is at risk for lack of exercise and has been provided with information " to increase physical activity for the benefit of her well-being.         China Young is a 44 year old, presenting for the following:  Physical (Bump inside lip/Nutrition and Pap )        3/15/2024     8:57 AM   Additional Questions   Roomed by Mary        Health Care Directive  Patient does not have a Health Care Directive or Living Will: Discussed advance care planning with patient; information given to patient to review.    HPI              3/8/2024   General Health   How would you rate your overall physical health? (!) FAIR   Feel stress (tense, anxious, or unable to sleep) To some extent   (!) STRESS CONCERN      3/8/2024   Nutrition   Three or more servings of calcium each day? Yes   Diet: Regular (no restrictions)   How many servings of fruit and vegetables per day? (!) 2-3   How many sweetened beverages each day? (!) 2         3/8/2024   Exercise   Days per week of moderate/strenous exercise 3 days   Average minutes spent exercising at this level 120 min         3/8/2024   Social Factors   Frequency of gathering with friends or relatives Once a week   Worry food won't last until get money to buy more No   Food not last or not have enough money for food? No   Do you have housing?  No   Are you worried about losing your housing? No   Lack of transportation? No   Unable to get utilities (heat,electricity)? No   Want help with housing or utility concern? No   (!) HOUSING CONCERN PRESENT      3/8/2024   Dental   Dentist two times every year? Yes         3/8/2024   TB Screening   Were you born outside of [place]?  No         Today's PHQ-2 Score:       3/15/2024     7:01 AM   PHQ-2 ( 1999 [employer])   Q1: Little interest or pleasure in doing things 0   Q2: Feeling down, depressed or hopeless 0   PHQ-2 Score 0   Q1: Little interest or pleasure in doing things Not at all   Q2: Feeling down, depressed or hopeless Not at all   PHQ-2 Score 0           3/8/2024   Substance Use   Alcohol more than 3/day or more than 7/wk  Not Applicable   Do you use any other substances recreationally? No     Social History     Tobacco Use    Smoking status: Never    Smokeless tobacco: Never    Tobacco comments:     no smokers in household   Substance Use Topics    Alcohol use: Yes     Alcohol/week: 0.0 standard drinks of alcohol     Comment: wine cooler x2/3-4x per yr    Drug use: No             3/8/2024   Breast Cancer Screening   Family history of breast, colon, or ovarian cancer? Yes         3/15/2024   LAST FHS-7 RESULTS   1st degree relative breast or ovarian cancer No   Any relative bilateral breast cancer No   Any male have breast cancer No   Any ONE woman have BOTH breast AND ovarian cancer No   Any woman with breast cancer before 50yrs No   2 or more relatives with breast AND/OR ovarian cancer No   2 or more relatives with breast AND/OR bowel cancer No        Mammogram Screening - Mammogram every 1-2 years updated in Health Maintenance based on mutual decision making        3/8/2024   STI Screening   New sexual partner(s) since last STI/HIV test? No     History of abnormal Pap smear: YES - updated in Problem List and Health Maintenance accordingly        Latest Ref Rng & Units 1/19/2023     2:30 PM 7/21/2017     3:43 PM 7/21/2017     3:40 PM   PAP / HPV   PAP  Negative for Intraepithelial Lesion or Malignancy (NILM)      PAP (Historical)   NIL     HPV 16 DNA Negative Negative   Negative    HPV 18 DNA Negative Positive   Negative    Other HR HPV Negative Negative   Negative      ASCVD Risk   The 10-year ASCVD risk score (Jesús MARTINEZ, et al., 2019) is: 0.6%    Values used to calculate the score:      Age: 44 years      Sex: Female      Is Non- : No      Diabetic: No      Tobacco smoker: No      Systolic Blood Pressure: 124 mmHg      Is BP treated: No      HDL Cholesterol: 55 mg/dL      Total Cholesterol: 181 mg/dL       Reviewed and updated as needed this visit by Provider                    Past Medical  "History:   Diagnosis Date    MARGARETTE III (cervical intraepithelial neoplasia grade III) with severe dysplasia 05    CKC, negative margins    NONSPECIFIC MEDICAL HISTORY     HPV/10-02    Normal delivery         Sepsis (H)     Stroke (H)     Varicella without mention of complication     Chickenpox     Past Surgical History:   Procedure Laterality Date    HC CONIZATION CERVIX,KNIFE/LASER  10/04/2005    Cold knife conization of cervix and endometrial biopsy.    TUBAL LIGATION           Review of Systems  Constitutional, HEENT, cardiovascular, pulmonary, GI, , musculoskeletal, neuro, skin, endocrine and psych systems are negative, except as otherwise noted.     Objective    Exam  /80 (Cuff Size: Adult Regular)   Pulse 71   Temp 97.5  F (36.4  C) (Temporal)   Resp 18   Ht 1.676 m (5' 6\")   Wt 117.9 kg (260 lb)   LMP 2024 (Approximate)   SpO2 99%   BMI 41.97 kg/m     Estimated body mass index is 41.97 kg/m  as calculated from the following:    Height as of this encounter: 1.676 m (5' 6\").    Weight as of this encounter: 117.9 kg (260 lb).          Physical Exam  GENERAL: alert and no distress  NECK: no adenopathy, no asymmetry, masses, or scars  RESP: lungs clear to auscultation - no rales, rhonchi or wheezes  CV: regular rate and rhythm, normal S1 S2, no S3 or S4, no murmur, click or rub, no peripheral edema  ABDOMEN: soft, nontender, no hepatosplenomegaly, no masses and bowel sounds normal  MS: no gross musculoskeletal defects noted, no edema        Signed Electronically by: Elizabeth Bradley NP    "

## 2024-03-15 NOTE — PATIENT INSTRUCTIONS
Preventive Care Advice   This is general advice given by our system to help you stay healthy. However, your care team may have specific advice just for you. Please talk to your care team about your preventive care needs.  Nutrition  Eat 5 or more servings of fruits and vegetables each day.  Try wheat bread, brown rice and whole grain pasta (instead of white bread, rice, and pasta).  Get enough calcium and vitamin D. Check the label on foods and aim for 100% of the RDA (recommended daily allowance).  Lifestyle  Exercise at least 150 minutes each week   (30 minutes a day, 5 days a week).  Do muscle strengthening activities 2 days a week. These help control your weight and prevent disease.  No smoking.  Wear sunscreen to prevent skin cancer.  Have a dental exam and cleaning every 6 months.  Yearly exams  See your health care team every year to talk about:  Any changes in your health.  Any medicines your care team has prescribed.  Preventive care, family planning, and ways to prevent chronic diseases.  Shots (vaccines)   HPV shots (up to age 26), if you've never had them before.  Hepatitis B shots (up to age 59), if you've never had them before.  COVID-19 shot: Get this shot when it's due.  Flu shot: Get a flu shot every year.  Tetanus shot: Get a tetanus shot every 10 years.  Pneumococcal, hepatitis A, and RSV shots: Ask your care team if you need these based on your risk.  Shingles shot (for age 50 and up).  General health tests  Diabetes screening:  Starting at age 35, Get screened for diabetes at least every 3 years.  If you are younger than age 35, ask your care team if you should be screened for diabetes.  Cholesterol test: At age 39, start having a cholesterol test every 5 years, or more often if advised.  Bone density scan (DEXA): At age 50, ask your care team if you should have this scan for osteoporosis (brittle bones).  Hepatitis C: Get tested at least once in your life.  STIs (sexually transmitted  infections)  Before age 24: Ask your care team if you should be screened for STIs.  After age 24: Get screened for STIs if you're at risk. You are at risk for STIs (including HIV) if:  You are sexually active with more than one person.  You don't use condoms every time.  You or a partner was diagnosed with a sexually transmitted infection.  If you are at risk for HIV, ask about PrEP medicine to prevent HIV.  Get tested for HIV at least once in your life, whether you are at risk for HIV or not.  Cancer screening tests  Cervical cancer screening: If you have a cervix, begin getting regular cervical cancer screening tests at age 21. Most people who have regular screenings with normal results can stop after age 65. Talk about this with your provider.  Breast cancer scan (mammogram): If you've ever had breasts, begin having regular mammograms starting at age 40. This is a scan to check for breast cancer.  Colon cancer screening: It is important to start screening for colon cancer at age 45.  Have a colonoscopy test every 10 years (or more often if you're at risk) Or, ask your provider about stool tests like a FIT test every year or Cologuard test every 3 years.  To learn more about your testing options, visit: https://www.[employer]/424468.pdf.  For help making a decision, visit: https://bit.ly/ty71481.  Prostate cancer screening test: If you have a prostate and are age 55 to 69, ask your provider if you would benefit from a yearly prostate cancer screening test.  Lung cancer screening: If you are a current or former smoker age 50 to 80, ask your care team if ongoing lung cancer screenings are right for you.  For informational purposes only. Not to replace the advice of your health care provider. Copyright   2023 [employer]. All rights reserved. Clinically reviewed by the [facility]. [employer] 047678 - REV 01/24.    Learning About Stress  What is stress?     Stress is your  body's response to a hard situation. Your body can have a physical, emotional, or mental response. Stress is a fact of life for most people, and it affects everyone differently. What causes stress for you may not be stressful for someone else.  A lot of things can cause stress. You may feel stress when you go on a job interview, take a test, or run a race. This kind of short-term stress is normal and even useful. It can help you if you need to work hard or react quickly. For example, stress can help you finish an important job on time.  Long-term stress is caused by ongoing stressful situations or events. Examples of long-term stress include long-term health problems, ongoing problems at work, or conflicts in your family. Long-term stress can harm your health.  How does stress affect your health?  When you are stressed, your body responds as though you are in danger. It makes hormones that speed up your heart, make you breathe faster, and give you a burst of energy. This is called the fight-or-flight stress response. If the stress is over quickly, your body goes back to normal and no harm is done.  But if stress happens too often or lasts too long, it can have bad effects. Long-term stress can make you more likely to get sick, and it can make symptoms of some diseases worse. If you tense up when you are stressed, you may develop neck, shoulder, or low back pain. Stress is linked to high blood pressure and heart disease.  Stress also harms your emotional health. It can make you au, tense, or depressed. Your relationships may suffer, and you may not do well at work or school.  What can you do to manage stress?  You can try these things to help manage stress:   Do something active. Exercise or activity can help reduce stress. Walking is a great way to get started. Even everyday activities such as housecleaning or yard work can help.  Try yoga or chintan chi. These techniques combine exercise and meditation. You may need  some training at first to learn them.  Do something you enjoy. For example, listen to music or go to a movie. Practice your hobby or do volunteer work.  Meditate. This can help you relax, because you are not worrying about what happened before or what may happen in the future.  Do guided imagery. Imagine yourself in any setting that helps you feel calm. You can use online videos, books, or a teacher to guide you.  Do breathing exercises. For example:  From a standing position, bend forward from the waist with your knees slightly bent. Let your arms dangle close to the floor.  Breathe in slowly and deeply as you return to a standing position. Roll up slowly and lift your head last.  Hold your breath for just a few seconds in the standing position.  Breathe out slowly and bend forward from the waist.  Let your feelings out. Talk, laugh, cry, and express anger when you need to. Talking with supportive friends or family, a counselor, or a abbey leader about your feelings is a healthy way to relieve stress. Avoid discussing your feelings with people who make you feel worse.  Write. It may help to write about things that are bothering you. This helps you find out how much stress you feel and what is causing it. When you know this, you can find better ways to cope.  What can you do to prevent stress?  You might try some of these things to help prevent stress:  Manage your time. This helps you find time to do the things you want and need to do.  Get enough sleep. Your body recovers from the stresses of the day while you are sleeping.  Get support. Your family, friends, and community can make a difference in how you experience stress.  Limit your news feed. Avoid or limit time on social media or news that may make you feel stressed.  Do something active. Exercise or activity can help reduce stress. Walking is a great way to get started.  Where can you learn more?  Go to https://www.healthwise.net/patiented  Enter N032 in the  "search box to learn more about \"Learning About Stress.\"  Current as of: October 24, 2023               Content Version: 14.0    2447-1090 [employer].   Care instructions adapted under license by your healthcare professional. If you have questions about a medical condition or this instruction, always ask your healthcare professional. [employer] disclaims any warranty or liability for your use of this information.      "

## 2024-03-18 ENCOUNTER — TELEPHONE (OUTPATIENT)
Dept: RHEUMATOLOGY | Facility: CLINIC | Age: 44
End: 2024-03-18
Payer: COMMERCIAL

## 2024-03-18 NOTE — TELEPHONE ENCOUNTER
Called patient, left message with Rheumatology phone number to call back.       Sent K[employer] message as well.    Vanessa Crenshaw,  ADELINA          Regarding: ADALID  Next weeks ADALID spot wed am 8:30, can you offer to this pt to discuss active sacroiliitis and planter fascitis that we need to start tx for.     Can be video or in person.     Eveline Trujillo, NP

## 2024-03-19 LAB
BKR LAB AP GYN ADEQUACY: NORMAL
BKR LAB AP GYN INTERPRETATION: NORMAL
BKR LAB AP HPV REFLEX: NORMAL
BKR LAB AP PREVIOUS ABNL DX: NORMAL
BKR LAB AP PREVIOUS ABNORMAL: NORMAL
PATH REPORT.COMMENTS IMP SPEC: NORMAL
PATH REPORT.COMMENTS IMP SPEC: NORMAL
PATH REPORT.RELEVANT HX SPEC: NORMAL

## 2024-03-20 ENCOUNTER — LAB (OUTPATIENT)
Dept: LAB | Facility: CLINIC | Age: 44
End: 2024-03-20
Payer: COMMERCIAL

## 2024-03-20 DIAGNOSIS — E78.5 HYPERLIPIDEMIA LDL GOAL <130: ICD-10-CM

## 2024-03-20 LAB
CHOLEST SERPL-MCNC: 182 MG/DL
FASTING STATUS PATIENT QL REPORTED: YES
HDLC SERPL-MCNC: 56 MG/DL
HUMAN PAPILLOMA VIRUS 16 DNA: NEGATIVE
HUMAN PAPILLOMA VIRUS 18 DNA: NEGATIVE
HUMAN PAPILLOMA VIRUS FINAL DIAGNOSIS: NORMAL
HUMAN PAPILLOMA VIRUS OTHER HR: NEGATIVE
LDLC SERPL CALC-MCNC: 111 MG/DL
NONHDLC SERPL-MCNC: 126 MG/DL
TRIGL SERPL-MCNC: 77 MG/DL

## 2024-03-20 PROCEDURE — 36415 COLL VENOUS BLD VENIPUNCTURE: CPT

## 2024-03-20 PROCEDURE — 80061 LIPID PANEL: CPT

## 2024-03-22 ENCOUNTER — PATIENT OUTREACH (OUTPATIENT)
Dept: FAMILY MEDICINE | Facility: CLINIC | Age: 44
End: 2024-03-22
Payer: COMMERCIAL

## 2024-03-26 NOTE — PROGRESS NOTES
Hannah Pacheco is a 44 year old who is being evaluated via a billable video visit.      How would you like to obtain your AVS? MyChart  If the video visit is dropped, the invitation should be resent by: Text to cell phone: [phone]  Will anyone else be joining your video visit? No  Are you still currently in the state of [place]? Yes  If patient encounters technical issues they should call [phone]    During this virtual visit the patient is located in [place], patient verifies this as the location during the entirety of this visit.     Video-Visit Details  Video Start Time: 8:42 AM    Type of service:  Video Visit    Video End Time:8:56 AM    Originating Location (pt. Location): Home  Distant Location (provider location):  [facility]  Platform used for Video Visit: [facility]          Rheumatology Clinic Visit  Eveline Trujillo CNP      Hannah Pacheco  YOB: 1980    Age: 44 year old   MRN# [id number]       Date of Visit: 03/27/2024  Primary care provider: No Ref-Primary, Physician              Subjective:     Hannah is a 44 year old female presents today for consult for hx of inflammatory arthritis. Referred by Dr. Elizabeth Bradley.     3/27/2024:  Here for results     2/28/2024 Consult  HPI: 2008 had hyperemesis during pregnancy, had PICC line, developed blood clot and then sepsis from a PICC line. Was in ICU for 1-2 weeks, hospitalized for over a month. With in 1 year after this and after delivery developed pain all over all the time. Does not remember joint swelling. Saw rheumatologist was dx with spondyloarthropathy and took SSZ, didn't take it for long.  Then stopped going. Had psychology/mental health factors of going to    Now that getting older things are getting harder.   Currently symptoms are; wide spread pain, feels like a bruise all over, not limited to joints. Joints specifically intense ache, deep and sometimes feet stabbing. End of day swelling of feet ankles.  Occ swelling of wrists.  Taking alleve daily x 2 weeks, takes edge off and helps with HA.   Everything is stiff in am. When she places her feet in am, it takes her a min to be able to bear weight.     ROS: Patient denies recent infections, fevers, DIPTI, weight loss, diarrhea, rashes, SOB, mouth sores,  SICCA, miscarriages, raynauds.   + R eye double vision, recent. This started 2 weeks ago, has eye appt next week. Feels like brain is swelling. HA every day in last 2 weeks.   + Numbness tingling in feet.   +Sick after eats sometimes, icky burps, heartburn.   + poor sleep, hard time fallen sleep and staying sleep. Doesn't think snores, has not had sleep apnea steady.   +Off and swelling in bl calves the last month with pain, L>R     Past Rheum hx:   Tx [facility] Dr. Young for spondyloarthropathy in past as well as Dr. Les Skaggs     Past Rheum tx:  SSZ    Social History  Wilfrido Yancey lives with , 5 kids  Works, clean houses,  and administration  No tobacco,  smokes  No ETOH  Walks , yoga     FMH:  Mom-OA, thyroid  2 aunts with PSO  2 aunts and a cousin ? MS     Active Problem list:  Patient Active Problem List    Diagnosis Date Noted    Morbid obesity (H) 01/19/2023     Priority: Medium    RA (rheumatoid arthritis) (H) 06/14/2016     Priority: Medium    Chronic fatigue syndrome 06/14/2016     Priority: Medium    PFO (patent foramen ovale) 01/15/2016     Priority: Medium    History of sepsis- blood clot and septic PICC, PFO found.  requesting records as they are archieved.  01/15/2016     Priority: Medium    Hyperlipidemia LDL goal <130 01/15/2016     Priority: Medium    MARGARETTE III (cervical intraepithelial neoplasia grade III) with severe dysplasia 09/05/2005     Priority: Medium     10/11/02 ASCUS  9/11/03 LSIL/CIN1  6/29/05 ASCUS/ + HR HPV (16)  8/15/05 colp: MARGARETTE 2  9/12/05 ECC: CIN3, negative margins  10/4/05 CKC: EMB WNL, ECC WNL, Cervix bx CIN3, negative margins  12/9/06, 11/16/07,  11/3/12, paps all NIL  1/15/16 Pap NIL, Neg HR HPV. First cotest since [facility]. Plan: repeat cotest in 12 mo.   04/08/17 Would consider patient to be lost to follow-up.  7/21/17 NIL Pap, Neg HPV. Plan cotest in 3 years.   1/19/23 NIL pap, + HR HPV # 18. Plan: [place] bef 4/19/23   3/13/23 [place] ECC: no MARGARETTE. Plan: cotest in 1 yr.   3/15/24 NIL Pap, Neg HR HPV. Plan: cotest in 1 yr      Plantar fascial fibromatosis 09/11/2003     Priority: Medium            Objective:     Physical Exam  LMP 02/22/2024 (Approximate)   Breastfeeding No   There is no height or weight on file to calculate BMI.    Constitutional: Lg  body habitus with out gross deformities. Well groomed.   Psych: nl judgement, orientation, memory, affect.    Labs:    Lab Results   Component Value Date    ALT 7 02/28/2024    AST 19 02/28/2024    CR 0.67 02/28/2024    CRP 10.9 (H) 06/14/2016   +AURORA  +SSA    Imaging:            Assessment and Plan:     1) Spondyloarthropathy/ ankylosing spondylitis SI and enthesitis: This is a 44 yr old female previously dx of spondyloarthropathy by Dr. Mendez at [facility] with mild swelling in hand and feet joints documented in 2010, pt was started on SSZ and lost to Follow-up. Pt here today with an increase in wide spread diffuse pain and poor sleep as well as an increase in joint specific pain that is a deep ache. Reports occ swelling of wrists and ankles. Biggest complaint is achilles pain and swelling with significant pain in am. Achilles symptoms limits her ability to be active, pt reports this is her number one concern. In addition reports numbness and tingling of MTP pads. Exam finds bl achilles TTP and ? Swelling of R.  Labs show past: -RF,-CCP. +AURORA, +SSA. Given past dx of spondy with question of enthesitis will do L ankle MRI and also refer pt to podiatry to explore non inflammatory causes.  MRI SI found acute on chronic sacroiliitis, MRI ankle moderate planter fascitis.   Update: Will start TNFi      2) +AURORA, +SSA: Question  "of lower peripheral neuropathy and upper GI symptoms with only being able to eat small meals and \"yucky burps\".  No SICCA complex. -repeat AURORA negative, JOHN negative.    3) Chronic pain syndrome with poor sleep: Will start pt on Trazodone, recommend she discuss sleep study and cymbalta with PCP.     4) Calve swelling x 2 weeks in setting of hx of blood clot: Will do US to R/O DVT, check LAC, beta 2 glycoprotein. Previous work up otherwise for clotting. - all WNL    5) Obesity: Discussed affect of wgt on joint health. Will check TSH, Hg A1C, B12, Vit D-  foiund Vit D def, on replacement, other labs normal.       Continue with long term management of chronic medical condition.     Pt instructions:   1) See me back July 15th 11 am     2) Labs this week for pre TNFi. Then about 4-6 weeks after you start    3) See MTM this week to set up TNFi      [employer] is a great resource       Eveline Trujillo, CNP  Rheumatology, [facility]              "

## 2024-03-27 ENCOUNTER — VIRTUAL VISIT (OUTPATIENT)
Dept: RHEUMATOLOGY | Facility: CLINIC | Age: 44
End: 2024-03-27
Attending: NURSE PRACTITIONER
Payer: COMMERCIAL

## 2024-03-27 DIAGNOSIS — M45.8 ANKYLOSING SPONDYLITIS OF SACROCOCCYGEAL REGION (H): Primary | ICD-10-CM

## 2024-03-27 PROCEDURE — G2211 COMPLEX E/M VISIT ADD ON: HCPCS | Mod: 95 | Performed by: NURSE PRACTITIONER

## 2024-03-27 PROCEDURE — 99214 OFFICE O/P EST MOD 30 MIN: CPT | Mod: 95 | Performed by: NURSE PRACTITIONER

## 2024-03-27 NOTE — PATIENT INSTRUCTIONS
1) See me back July 15th 11 am     2) Labs this week for pre TNFi. Then about 4-6 weeks after you start    3) See MTM this week to set up TNFi      [employer] is a great resource

## 2024-03-28 ENCOUNTER — LAB (OUTPATIENT)
Dept: LAB | Facility: CLINIC | Age: 44
End: 2024-03-28
Payer: COMMERCIAL

## 2024-03-28 DIAGNOSIS — M45.8 ANKYLOSING SPONDYLITIS OF SACROCOCCYGEAL REGION (H): ICD-10-CM

## 2024-03-28 LAB
ALBUMIN SERPL BCG-MCNC: 4 G/DL (ref 3.5–5.2)
ALT SERPL W P-5'-P-CCNC: 12 U/L (ref 0–50)
AST SERPL W P-5'-P-CCNC: 16 U/L (ref 0–45)
CREAT SERPL-MCNC: 0.76 MG/DL (ref 0.51–0.95)
CRP SERPL-MCNC: 4.67 MG/L
EGFRCR SERPLBLD CKD-EPI 2021: >90 ML/MIN/1.73M2
ERYTHROCYTE [DISTWIDTH] IN BLOOD BY AUTOMATED COUNT: 12.3 % (ref 10–15)
ERYTHROCYTE [SEDIMENTATION RATE] IN BLOOD BY WESTERGREN METHOD: 8 MM/HR (ref 0–20)
HBV CORE AB SERPL QL IA: NONREACTIVE
HCT VFR BLD AUTO: 40 % (ref 35–47)
HCV AB SERPL QL IA: NONREACTIVE
HGB BLD-MCNC: 13.4 G/DL (ref 11.7–15.7)
MCH RBC QN AUTO: 31.8 PG (ref 26.5–33)
MCHC RBC AUTO-ENTMCNC: 33.5 G/DL (ref 31.5–36.5)
MCV RBC AUTO: 95 FL (ref 78–100)
PLATELET # BLD AUTO: 273 10E3/UL (ref 150–450)
RBC # BLD AUTO: 4.22 10E6/UL (ref 3.8–5.2)
WBC # BLD AUTO: 7.1 10E3/UL (ref 4–11)

## 2024-03-28 PROCEDURE — 85652 RBC SED RATE AUTOMATED: CPT

## 2024-03-28 PROCEDURE — 85027 COMPLETE CBC AUTOMATED: CPT

## 2024-03-28 PROCEDURE — 86704 HEP B CORE ANTIBODY TOTAL: CPT

## 2024-03-28 PROCEDURE — 82040 ASSAY OF SERUM ALBUMIN: CPT

## 2024-03-28 PROCEDURE — 86803 HEPATITIS C AB TEST: CPT

## 2024-03-28 PROCEDURE — 84460 ALANINE AMINO (ALT) (SGPT): CPT

## 2024-03-28 PROCEDURE — 82565 ASSAY OF CREATININE: CPT

## 2024-03-28 PROCEDURE — 86481 TB AG RESPONSE T-CELL SUSP: CPT

## 2024-03-28 PROCEDURE — 36415 COLL VENOUS BLD VENIPUNCTURE: CPT

## 2024-03-28 PROCEDURE — 86140 C-REACTIVE PROTEIN: CPT

## 2024-03-28 PROCEDURE — 84450 TRANSFERASE (AST) (SGOT): CPT

## 2024-03-29 LAB
GAMMA INTERFERON BACKGROUND BLD IA-ACNC: 0.01 IU/ML
M TB IFN-G BLD-IMP: NEGATIVE
M TB IFN-G CD4+ BCKGRND COR BLD-ACNC: 9.99 IU/ML
MITOGEN IGNF BCKGRD COR BLD-ACNC: 0.01 IU/ML
MITOGEN IGNF BCKGRD COR BLD-ACNC: 0.02 IU/ML
QUANTIFERON MITOGEN: 10 IU/ML
QUANTIFERON NIL TUBE: 0.01 IU/ML
QUANTIFERON TB1 TUBE: 0.03 IU/ML
QUANTIFERON TB2 TUBE: 0.02

## 2024-04-02 ENCOUNTER — VIRTUAL VISIT (OUTPATIENT)
Dept: RHEUMATOLOGY | Facility: CLINIC | Age: 44
End: 2024-04-02
Attending: NURSE PRACTITIONER
Payer: COMMERCIAL

## 2024-04-02 DIAGNOSIS — E55.9 VITAMIN D DEFICIENCY: ICD-10-CM

## 2024-04-02 DIAGNOSIS — G47.9 SLEEP DISTURBANCE: ICD-10-CM

## 2024-04-02 DIAGNOSIS — M45.8 ANKYLOSING SPONDYLITIS OF SACROCOCCYGEAL REGION (H): Primary | ICD-10-CM

## 2024-04-02 DIAGNOSIS — Z71.85 VACCINE COUNSELING: ICD-10-CM

## 2024-04-02 NOTE — Clinical Note
4/2/2024       RE: Hannah Pacheco  [address]  [place] [address]     Dear Colleague,    Thank you for referring your patient, Hannah Pacheco, to the [facility] RHEUMATOLOGY [facility] [place] [facility]. Please see a copy of my visit note below.    Medication Therapy Management (MTM) Encounter    ASSESSMENT:                            Medication Adherence/Access: No issues identified    Ankylosing Spondylitis:     Provided education on *** today including dosing, general administration, side effects (both common/serious), precautions, monitoring and time to efficacy. Discussed data on malignancy and risk of serious infection in depth. Encouraged indicated non-live vaccines and avoidance of live vaccines. Discussed potential need to hold therapy in the setting of signs/symptoms of active infection. Encouraged her to contact the rheumatology clinic in the event she has questions on this. Would benefit from starting *** once it arrives and using *** as directed.    Vaccines: Encouraged indicated non-live vaccines and avoidance of live vaccines. Per ACIP guidelines, patient is eligible for VACCINATION: Covid-19, Influenza, Pneumococcal, and Zoster.     Insomnia:    Vitamin D deficiency:     PLAN:                            Start Humira 40 mg subcutaneously every 14 days.  Administration Video: https://www.OUTSIDE THE BOX MARKETINGira.LPATH/humira-complete/injection?rizwan=ppc_ppd_ggl_humira_complete_humira_[email or web address]_video_injection_exact_[email or web address]&gclid=[id number]&gclsrc=aw.ds   Humira Complete: https://www.AMSC.com/humira-complete  Labs in 4-6 weeks   Consider the following vaccines:  Shingles (Shingrix)  Vaccine is 2-dose series.  Pneumonia (Prevnar 20)  Vaccine is 1 dose.        Follow-up: with MTM pharmacist on     SUBJECTIVE/OBJECTIVE:                          Hannah Pacheco is a 44 year old female called for an  initial visit. She was referred to me from Eveline Trujillo NP.      Reason for visit: TNFi start.    Allergies/ADRs: Reviewed in chart  Past Medical History: Reviewed in chart  Tobacco: She reports that she has never smoked. She has never used smokeless tobacco.  Alcohol: none    Medication Adherence/Access: no issues reported    Ankylosing Spondylitis:   Naproxen 220 mg as needed   Patient experiencing pain and stiffness all over body.     Occasional acetaminophen   Naproxen - taking - only takes if very bad    Ibuprofen - takes if inflammed few times per week     Has aspirin arthritis     Use ibuprofen in past???    Currently symptoms are; wide spread pain, feels like a bruise all over, not limited to joints. Joints specifically intense ache, deep and sometimes feet stabbing. End of day swelling of feet ankles. Occ swelling of wrists.  Taking alleve daily x 2 weeks, takes edge off and helps with HA.   Everything is stiff in am. When she places her feet in am, it takes her a min to be able to bear weight.     Reviewed baseline pre-biologic screening.   Hep C antibody non-reactive (2024)  Hep B surface antibody not completed  Hep B surface antigen non-reactive (2006)  Hep B core antibody non-reactive (2024)  Quantiferon TB Negative (2024)  HIV antigen non-reactive (2006)    Referral: start TNFi, of note maybe MS in aunts     Vaccines:  Eligible for shingles, pneumonia, annual influenza, and COVID vaccines. Received hepatitis B     Insomnia:  Trazodone 100 mg nightly as needed    Not taking - has available if needed     Vitamin D deficiency:  Vitamin D2 50,000 international unit(s) weekly    No concerns        Today's Vitals: LMP 02/22/2024 (Approximate)   ----------------    I spent *** minutes with this patient today. All changes were made via collaborative practice agreement with Eveline Trujillo NP. A copy of the visit note was provided to the patient's provider(s).    A summary of these recommendations was sent  via [employer].    Franklin Jolley, PharmD  Medication Therapy Management Pharmacist  [facility] Rhe[facility]ology [facility]  Phone: [phone]     Telemedicine Visit Details  Type of service:  Telephone visit  Start Time:  2:00 PM  End Time: ***     Medication Therapy Recommendations  No medication therapy recommendations to display         Medication Therapy Management (MTM) Encounter    ASSESSMENT:                            Medication Adherence/Access: No issues identified    Ankylosing Spondylitis: Patient recently diagnosed with ankylosing spondylitis and indicated for additional therapy. Previously tried and failed multiple NSAIDs and presents to discuss TNFi therapy. Provided education on Humira  today including dosing, general administration, side effects (both common/serious), precautions, monitoring and time to efficacy. Discussed data on malignancy and risk of serious infection in depth. Discussed potential need to hold therapy in the setting of signs/symptoms of active infection. Encouraged her to contact the rheumatology clinic in the event she has questions on this. Would benefit from starting Humira once it arrives and using 40 mg subcutaneously every 14 days as directed.    Vaccines: Encouraged indicated non-live vaccines and avoidance of live vaccines. Per ACIP guidelines, patient is eligible for VACCINATION: Covid-19, Influenza, Pneumococcal, and Zoster. Patient agreed to get shingles and pneumonia vaccines now.    Insomnia: Stable.     Vitamin D deficiency: Patient recently started therapy and is scheduled to follow-up in 12 weeks to reassess.      PLAN:                            Start Humira 40 mg subcutaneous injection every 14 days.  Administration Video: https://www.Pharmacaira.com/[email or web address]-complete/injection?rizwan=ppc_ppd_ggl_humira_complete_humira_pen_injection_video_injection_exact_[email or web address]&[id number].gayle   Humira Complete:  https://www.[employer].com/humira-complete    Consider the following vaccines:  Shingles (Shingrix)  Vaccine is 2-dose series. Second dose due 2-6 months after first.   Pneumonia (Prevnar 20)    Complete routine labs in 4-6 weeks, then every 3 months.      Follow-up: with MT pharmacist on 5/15/24.    SUBJECTIVE/OBJECTIVE:                          Hannah Pacheco is a 44 year old female called for an initial visit. She was referred to me from Eveline Trujillo NP.      Reason for visit: TNFi start.    Allergies/ADRs: Reviewed in chart  Past Medical History: Reviewed in chart  Tobacco: She reports that she has never smoked. She has never used smokeless tobacco.  Alcohol: none    Medication Adherence/Access: no issues reported    Ankylosing Spondylitis:   Naproxen 220 mg as needed   Patient experiencing pain and stiffness all over her body. Reported pain is not limited to joints, but joints have an intense ache. Gets swelling in the ankles and occasionally in the wrists. Very stiff in the mornings and she loosens up with movement. Currently using naproxen to manage. Tries to only take if pain is severe, but requiring more frequently. Occasionally takes acetaminophen in addition. Has tried ibuprofen and aspirin in the past.     Reviewed baseline pre-biologic screening.   Hep C antibody non-reactive (2024)  Hep B surface antibody not completed  Hep B surface antigen non-reactive (2006)  Hep B core antibody non-reactive (2024)  Quantiferon TB Negative (2024)  HIV antigen non-reactive (2006)    Vaccines:  Eligible for shingles, pneumonia, annual influenza, and COVID vaccines. Received hepatitis B vaccine earlier this year.     Insomnia:  Trazodone 100 mg nightly as needed  Has not taken in awhile. Available if needed but will only use if absolutely needed.     Vitamin D deficiency:  Vitamin D2 50,000 international unit(s) weekly  Patient recently started supplementation for low vitamin D. Reported no concerns with therapy.       Today's Vitals: LMP 02/22/2024 (Approximate)   ----------------    I spent 40 minutes with this patient today. All changes were made via collaborative practice agreement with Eveline Trujillo NP. A copy of the visit note was provided to the patient's provider(s).    A summary of these recommendations was sent via [employer].    Franklin Jolley PharmD  Medication Therapy Management Pharmacist  [facility]  Phone: [phone]     Telemedicine Visit Details  Type of service:  Telephone visit  Start Time:  2:00 PM  End Time:  2:40 PM     Medication Therapy Recommendations  No medication therapy recommendations to display           Again, thank you for allowing me to participate in the care of your patient.      Sincerely,    Franklin Jolley RPH

## 2024-04-02 NOTE — PROGRESS NOTES
Medication Therapy Management (MTM) Encounter    ASSESSMENT:                            Medication Adherence/Access: No issues identified    Ankylosing Spondylitis: Patient recently diagnosed with ankylosing spondylitis and indicated for additional therapy. Previously tried and failed multiple NSAIDs and presents to discuss TNFi therapy. Provided education on Humira  today including dosing, general administration, side effects (both common/serious), precautions, monitoring and time to efficacy. Discussed data on malignancy and risk of serious infection in depth. Discussed potential need to hold therapy in the setting of signs/symptoms of active infection. Encouraged her to contact the rheumatology clinic in the event she has questions on this. Would benefit from starting Humira once it arrives and using 40 mg subcutaneously every 14 days as directed.    Vaccines: Encouraged indicated non-live vaccines and avoidance of live vaccines. Per ACIP guidelines, patient is eligible for VACCINATION: Covid-19, Influenza, Pneumococcal, and Zoster. Patient agreed to get shingles and pneumonia vaccines now.    Insomnia: Stable.     Vitamin D deficiency: Patient recently started therapy and is scheduled to follow-up in 12 weeks to reassess.      PLAN:                            Start Humira 40 mg subcutaneous injection every 14 days.  Administration Video: https://www.LearnUp/humira-complete/injection?rizwan=ppc_ppd_ggl_humira_complete_humira_pen_injection_video_injection_exact_[email or web address]&[id number]&gclsrc=aw.ds   Humira Complete: https://www.[employer] Technology.com/humira-complete    Consider the following vaccines:  Shingles (Shingrix)  Vaccine is 2-dose series. Second dose due 2-6 months after first.   Pneumonia (Prevnar 20)    Complete routine labs in 4-6 weeks, then every 3 months.      Follow-up: with MTM pharmacist on 5/15/24.    SUBJECTIVE/OBJECTIVE:                          Hannah  Tara is a 44 year old female called for an initial visit. She was referred to me from Eveline Trujillo NP.      Reason for visit: TNFi start.    Allergies/ADRs: Reviewed in chart  Past Medical History: Reviewed in chart  Tobacco: She reports that she has never smoked. She has never used smokeless tobacco.  Alcohol: none    Medication Adherence/Access: no issues reported    Ankylosing Spondylitis:   Naproxen 220 mg as needed   Patient experiencing pain and stiffness all over her body. Reported pain is not limited to joints, but joints have an intense ache. Gets swelling in the ankles and occasionally in the wrists. Very stiff in the mornings and she loosens up with movement. Currently using naproxen to manage. Tries to only take if pain is severe, but requiring more frequently. Occasionally takes acetaminophen in addition. Has tried ibuprofen and aspirin in the past with little effect.     Reviewed baseline pre-biologic screening.   Hep C antibody non-reactive (2024)  Hep B surface antibody not completed  Hep B surface antigen non-reactive (2006)  Hep B core antibody non-reactive (2024)  Quantiferon TB Negative (2024)  HIV antigen non-reactive (2006)    Vaccines:  Eligible for shingles, pneumonia, annual influenza, and COVID vaccines. Received hepatitis B vaccine earlier this year.     Insomnia:  Trazodone 100 mg nightly as needed  Has not taken in awhile. Available if needed but will only use if absolutely needed.     Vitamin D deficiency:  Vitamin D2 50,000 international unit(s) weekly  Patient recently started supplementation for low vitamin D. Reported no concerns with therapy.      Today's Vitals: LMP 02/22/2024 (Approximate)   ----------------    I spent 40 minutes with this patient today. All changes were made via collaborative practice agreement with Eveline Trujillo NP. A copy of the visit note was provided to the patient's provider(s).    A summary of these recommendations was sent via [employer].    Franklin  Nasim Jolley  Medication Therapy Management Pharmacist  [facility]umat[facility]  Phone: [phone]     Telemedicine Visit Details  Type of service:  Telephone visit  Start Time:  2:00 PM  End Time:  2:40 PM     Medication Therapy Recommendations  Ankylosing spondylitis of sacrococcygeal region (H)    Rationale: Untreated condition - Needs additional medication therapy - Indication   Recommendation: Start Medication - Start Humira   Status: Accepted per CPA         Vaccine counseling    Rationale: Preventive therapy - Needs additional medication therapy - Indication   Recommendation: Start Medication - Get recommended vaccines   Status: Patient Agreed - Adherence/Education

## 2024-04-05 NOTE — TELEPHONE ENCOUNTER
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 Pt made appt for [facility] Location     RECORDS REQUESTED FROM:       Clinic name Comments Records Status Imaging Status   [facility]  3/15/24- OV Elizabeth Bradley, NP  EPIC             \"Please notify/message CSS if patient completed outside imaging prior to scheduled appointment and/or any outside records that might have been missed at pre visit -Thank you\"  "

## 2024-04-09 ENCOUNTER — TELEPHONE (OUTPATIENT)
Dept: FAMILY MEDICINE | Facility: CLINIC | Age: 44
End: 2024-04-09
Payer: COMMERCIAL

## 2024-04-09 NOTE — TELEPHONE ENCOUNTER
Patient is coming in next week for her Hep B shingles and Prevnar 20 according to [facility] she is not due for shingles or prevnar 20. I reviewed her chart and saw that she seen rheumatology on 4/2/24 and she is starting Humira an they recommend her to get these do you mind signing the orders so I can administer?    Please advise?    Anahi Nolan MA 4/9/2024

## 2024-04-12 NOTE — PATIENT INSTRUCTIONS
"Recommendations from today's MTM visit:                                                    MTM (medication therapy management) is a service provided by a clinical pharmacist designed to help you get the most of out of your medicines.      Start Humira 40 mg subcutaneous injection every 14 days.  Administration Video: https://www.humira.Spontacts/humira-complete/injection?rizwan=ppc_ppd_ggl_humira_complete_humira_[email or web address]_video_injection_exact_[email or web address]&gclid=[email or web address]&[email or web address]=aw.ds   Humira Complete: https://www.humira.com/humira-complete    Consider the following vaccines:  Shingles (Shingrix)  Vaccine is 2-dose series. Second dose due 2-6 months after first.   Pneumonia (Prevnar 20)    Complete routine labs in 4-6 weeks, then every 3 months.      Follow-up: with MTM pharmacist on 5/15/24.    It was great speaking with you today.  I value your experience and would be very thankful for your time in providing feedback in our clinic survey. In the next few days, you may receive an email or text message from [employer] with a link to a survey related to your  clinical pharmacist.\"     To schedule another MTM appointment, please call the clinic directly or you may call the MTM scheduling line at [phone].    My Clinical Pharmacist's contact information:                                                      Please feel free to contact me with any questions or concerns you have.      Franklin Jolley, Nasim  Medication Therapy Management Pharmacist  [facility]umatology [facility]  Phone: [phone]    "

## 2024-04-15 ENCOUNTER — TELEPHONE (OUTPATIENT)
Dept: RHEUMATOLOGY | Facility: CLINIC | Age: 44
End: 2024-04-15

## 2024-04-15 ENCOUNTER — ALLIED HEALTH/NURSE VISIT (OUTPATIENT)
Dept: FAMILY MEDICINE | Facility: CLINIC | Age: 44
End: 2024-04-15
Payer: COMMERCIAL

## 2024-04-15 DIAGNOSIS — Z23 ENCOUNTER FOR IMMUNIZATION: Primary | ICD-10-CM

## 2024-04-15 PROCEDURE — 90750 HZV VACC RECOMBINANT IM: CPT

## 2024-04-15 PROCEDURE — 90472 IMMUNIZATION ADMIN EACH ADD: CPT

## 2024-04-15 PROCEDURE — 99207 PR NO CHARGE NURSE ONLY: CPT

## 2024-04-15 PROCEDURE — 90677 PCV20 VACCINE IM: CPT

## 2024-04-15 PROCEDURE — 90471 IMMUNIZATION ADMIN: CPT

## 2024-04-15 PROCEDURE — 90746 HEPB VACCINE 3 DOSE ADULT IM: CPT

## 2024-04-15 NOTE — PROGRESS NOTES
Prior to immunization administration, verified patients identity using patient s name and date of birth. Please see Immunization Activity for additional information.     Screening Questionnaire for Adult Immunization    Are you sick today?   No   Do you have allergies to medications, food, a vaccine component or latex?   No   Have you ever had a serious reaction after receiving a vaccination?   No   Do you have a long-term health problem with heart, lung, kidney, or metabolic disease (e.g., diabetes), asthma, a blood disorder, no spleen, complement component deficiency, a cochlear implant, or a spinal fluid leak?  Are you on long-term aspirin therapy?   No   Do you have cancer, leukemia, HIV/AIDS, or any other immune system problem?   No   Do you have a parent, brother, or sister with an immune system problem?   No   In the past 3 months, have you taken medications that affect  your immune system, such as prednisone, other steroids, or anticancer drugs; drugs for the treatment of rheumatoid arthritis, Crohn s disease, or psoriasis; or have you had radiation treatments?   No   Have you had a seizure, or a brain or other nervous system problem?   No   During the past year, have you received a transfusion of blood or blood    products, or been given immune (gamma) globulin or antiviral drug?   No   For women: Are you pregnant or is there a chance you could become       pregnant during the next month?   No   Have you received any vaccinations in the past 4 weeks?   No     Immunization questionnaire answers were all negative.    I have reviewed the following standing orders:   This patient is due and qualifies for the Hepatitis B vaccine.    Click here for Hepatitis B Standing Order    I have reviewed the vaccines inclusion and exclusion criteria; No concerns regarding eligibility.       This patient is due and qualifies for the Pneumococcal vaccine.    Click here for Pneumococcal (Adult) Standing Order    I have  reviewed the vaccines inclusion and exclusion criteria;No concerns regarding eligibility.         This patient is due and qualifies for the Zoster vaccine.    Click here for Zoster Standing Order    I have reviewed the vaccines inclusion and exclusion criteria; No concerns regarding eligibility.     Patient instructed to remain in clinic for 15 minutes afterwards, and to report any adverse reactions.     Screening performed by Julianne Nolan on 4/15/2024 at 9:22 AM.

## 2024-04-15 NOTE — TELEPHONE ENCOUNTER
PA Initiation    Medication: HUMIRA *CF* PEN 40 MG/0.4ML SC PNKT  Insurance Company: [employer] [place] - Phone [phone] Fax [phone]  Pharmacy Filling the Rx: [facility] - [place], [place] - [address]  Filling Pharmacy Phone:    Filling Pharmacy Fax:    Start Date: 4/15/2024   [id number])      JEWELS Fan, [facility]  Specialty Pharmacy Clinic Liaison     [facility]    [email or web address]     Phone: [phone]  Fax: [phone]

## 2024-04-16 NOTE — TELEPHONE ENCOUNTER
Plan requested TB results    Faxed to [phone]      JEWELS Fan, [facility]  Specialty Pharmacy Clinic Liaison     [facility]    [email or web address]     Phone: [phone]  Fax: [phone]

## 2024-04-17 NOTE — TELEPHONE ENCOUNTER
Prior Authorization Approval    Medication: HUMIRA *CF* PEN 40 MG/0.4ML SC PNKT  Authorization Effective Date: 4/17/2024  Authorization Expiration Date: 4/16/2025  Approved Dose/Quantity: 2 each  Reference #: [id number]   Insurance Company: [employer] [place] - Phone [phone] Fax [phone]  Expected CoPay: $    CoPay Card Available: No    Financial Assistance Needed: NA  Which Pharmacy is filling the prescription: [facility] - [facility] [place] [address], [facility]  Specialty Pharmacy Clinic Liaison     [facility] Special[facility]    [email or web address]     Phone: [phone]  Fax: [phone]

## 2024-04-24 ENCOUNTER — MYC MEDICAL ADVICE (OUTPATIENT)
Dept: RHEUMATOLOGY | Facility: CLINIC | Age: 44
End: 2024-04-24
Payer: COMMERCIAL

## 2024-04-29 ENCOUNTER — TRANSFERRED RECORDS (OUTPATIENT)
Dept: HEALTH INFORMATION MANAGEMENT | Facility: CLINIC | Age: 44
End: 2024-04-29

## 2024-04-29 ENCOUNTER — VIRTUAL VISIT (OUTPATIENT)
Dept: RHEUMATOLOGY | Facility: CLINIC | Age: 44
End: 2024-04-29
Payer: COMMERCIAL

## 2024-04-29 DIAGNOSIS — E55.9 VITAMIN D DEFICIENCY: Primary | ICD-10-CM

## 2024-04-29 PROCEDURE — G2211 COMPLEX E/M VISIT ADD ON: HCPCS | Performed by: NURSE PRACTITIONER

## 2024-04-29 PROCEDURE — 99214 OFFICE O/P EST MOD 30 MIN: CPT | Mod: 95 | Performed by: NURSE PRACTITIONER

## 2024-04-29 NOTE — PROGRESS NOTES
Hannah Pacheco is a 44 year old who is being evaluated via a billable video visit.      How would you like to obtain your AVS? MyChart  If the video visit is dropped, the invitation should be resent by: Text to cell phone: [phone]  Will anyone else be joining your video visit? No  Are you still currently in the state of [place]? Yes  If patient encounters technical issues they should call [phone]    During this virtual visit the patient is located in [place], patient verifies this as the location during the entirety of this visit.     Video-Visit Details  Video Start Time: 2:30 PM    Type of service:  Video Visit    Video End Time:2:39 PM    Originating Location (pt. Location): Home  Distant Location (provider location):  [facility]  Platform used for Video Visit: [facility]              Rheumatology Clinic Visit  Eveline Trujillo CNP      Hannah Pacheco  YOB: 1980    Age: 44 year old   MRN# [id number]       Date of Visit: 04/29/2024  Primary care provider: No Ref-Primary, Physician              Subjective:     Hannah is a 44 year old female presents today for follow-up for Spondyloarthropathy/ ankylosing spondylitis SI and enthesitis  (dx 2010), vit d def. Current treatment: Humira 40 mg eow (4/28/24) , drisdo 50,000 international unit(s) weekly.     4/24/24:    Was able to obtain humira and took first injection and was well tolerated.     Is sleeping better so hasn't started trazodone.     3/27/2024:  Here for results     2/28/2024 Consult  HPI: 2008 had hyperemesis during pregnancy, had PICC line, developed blood clot and then sepsis from a PICC line. Was in ICU for 1-2 weeks, hospitalized for over a month. With in 1 year after this and after delivery developed pain all over all the time. Does not remember joint swelling. Saw rheumatologist was dx with spondyloarthropathy and took SSZ, didn't take it for long.  Then stopped going. Had psychology/mental health factors of  going to    Now that getting older things are getting harder.   Currently symptoms are; wide spread pain, feels like a bruise all over, not limited to joints. Joints specifically intense ache, deep and sometimes feet stabbing. End of day swelling of feet ankles. Occ swelling of wrists.  Taking alleve daily x 2 weeks, takes edge off and helps with HA.   Everything is stiff in am. When she places her feet in am, it takes her a min to be able to bear weight.     ROS: Patient denies recent infections, fevers, DIPTI, weight loss, diarrhea, rashes, SOB, mouth sores,  SICCA, miscarriages, raynauds.   + R eye double vision, recent. This started 2 weeks ago, has eye appt next week. Feels like brain is swelling. HA every day in last 2 weeks.   + Numbness tingling in feet.   +Sick after eats sometimes, icky burps, heartburn.   + poor sleep, hard time fallen sleep and staying sleep. Doesn't think snores, has not had sleep apnea steady.   +Off and swelling in bl calves the last month with pain, L>R     Past Rheum hx:   Tx by [facility] Dr. Young for spondyloarthropathy in past as well as Dr. Les Skaggs     Past Rheum tx:  SSZ    Social History  [place] lives with , 5 kids  Works, clean houses,  and administration  No tobacco,  smokes  No ETOH  Walks , yoga     FMH:  Mom-OA, thyroid  2 aunts with PSO  2 aunts and a cousin ? MS     Active Problem list:  Patient Active Problem List    Diagnosis Date Noted    Morbid obesity (H) 01/19/2023     Priority: Medium    RA (rheumatoid arthritis) (H) 06/14/2016     Priority: Medium    Chronic fatigue syndrome 06/14/2016     Priority: Medium    PFO (patent foramen ovale) 01/15/2016     Priority: Medium    History of sepsis- blood clot and septic PICC, PFO found.  requesting records as they are archieved.  01/15/2016     Priority: Medium    Hyperlipidemia LDL goal <130 01/15/2016     Priority: Medium    MARGARETTE III (cervical intraepithelial neoplasia grade III) with  severe dysplasia 09/05/2005     Priority: Medium     10/11/02 ASCUS  9/11/03 LSIL/CIN1  6/29/05 ASCUS/ + HR HPV (16)  8/15/05 colp: MARGARETTE 2  9/12/05 ECC: CIN3, negative margins  10/4/05 [facility]: EMB WNL, ECC WNL, Cervix bx CIN3, negative margins  12/9/06, 11/16/07, 11/3/12, paps all NIL  1/15/16 Pap NIL, Neg HR HPV. First cotest since [facility]. Plan: repeat cotest in 12 mo.   04/08/17 Would consider patient to be lost to follow-up.  7/21/17 NIL Pap, Neg HPV. Plan cotest in 3 years.   1/19/23 NIL pap, + HR HPV # 18. Plan: [facility] bef 4/19/23   3/13/23 [facility] ECC: no MARGARETTE. Plan: cotest in 1 yr.   3/15/24 NIL Pap, Neg HR HPV. Plan: cotest in 1 yr      Plantar fascial fibromatosis 09/11/2003     Priority: Medium            Objective:     Physical Exam  LMP 04/08/2024   Breastfeeding No   There is no height or weight on file to calculate BMI.    Constitutional: Lg  body habitus with out gross deformities. Well groomed.   Psych: nl judgement, orientation, memory, affect.    Labs:    Lab Results   Component Value Date    ALT 12 03/28/2024    AST 16 03/28/2024    CR 0.76 03/28/2024    CRP 10.9 (H) 06/14/2016   +AURORA  +SSA    Imaging:            Assessment and Plan:     1) Spondyloarthropathy/ ankylosing spondylitis SI and enthesitis: This is a 44 yr old female previously dx of spondyloarthropathy by Dr. Mendez at [facility] with mild swelling in hand and feet joints documented in 2010, pt was started on SSZ and lost to Follow-up. Pt here today with an increase in wide spread diffuse pain and poor sleep as well as an increase in joint specific pain that is a deep ache. Reports occ swelling of wrists and ankles. Biggest complaint is achilles pain and swelling with significant pain in am. Achilles symptoms limits her ability to be active, pt reports this is her number one concern. In addition reports numbness and tingling of MTP pads. Exam finds bl achilles TTP and ? Swelling of R.  Labs show past: -RF,-CCP. +AURORA, +SSA. Given past dx of spondy with  "question of enthesitis will do L ankle MRI and also refer pt to podiatry to explore non inflammatory causes.  MRI SI found acute on chronic sacroiliitis, MRI ankle moderate planter fascitis.   Update: Started humira, first dose tolerated well. Will do labs in 1 month.       2) +AURORA, +SSA: Question of lower peripheral neuropathy and upper GI symptoms with only being able to eat small meals and \"yucky burps\".  No SICCA complex. -repeat AURORA negative, JOHN negative.    3) Chronic pain syndrome with poor sleep: Will start pt on Trazodone, recommend she discuss sleep study and cymbalta with PCP. : Update, sleeping better so did not start trazodone.     4) Calve swelling x 2 weeks in setting of hx of blood clot: Will do US to R/O DVT, check LAC, beta 2 glycoprotein. Previous work up otherwise for clotting. - all WNL    5) Obesity: Discussed affect of wgt on joint health. Will check TSH, Hg A1C, B12, Vit D-  foiund Vit D def, on replacement, other labs normal. Will update vit d level once replacement is finished.       Continue with long term management of chronic medical condition.     Pt instructions:         1) Continue Humira 40 mg every other week    2) Finish vitamin D    3) Labs in June    4) Keep appt with me in July    5) Ok to cancel with Franklin     Pt states understanding and agreement to plan of care, has no further questions.     Eveline Trujillo, CNP  Rheumatology, [facility]              "

## 2024-04-29 NOTE — PATIENT INSTRUCTIONS
1) Continue Humira 40 mg every other week    2) Finish vitamin D    3) Labs in June    4) Keep appt with me in July    5) Ok to cancel with Franklin

## 2024-04-30 NOTE — TELEPHONE ENCOUNTER
Appointment was changed.      DEANNE Brown  Rheumatology/Infectious disease  [facility]   Rheumatology ph:[phone]  Infectious Disease ph:[phone]

## 2024-05-09 ENCOUNTER — TELEPHONE (OUTPATIENT)
Dept: DERMATOLOGY | Facility: CLINIC | Age: 44
End: 2024-05-09

## 2024-05-09 ENCOUNTER — OFFICE VISIT (OUTPATIENT)
Dept: FAMILY MEDICINE | Facility: CLINIC | Age: 44
End: 2024-05-09
Payer: COMMERCIAL

## 2024-05-09 VITALS
BODY MASS INDEX: 42.27 KG/M2 | SYSTOLIC BLOOD PRESSURE: 139 MMHG | WEIGHT: 263 LBS | HEART RATE: 83 BPM | RESPIRATION RATE: 18 BRPM | OXYGEN SATURATION: 100 % | HEIGHT: 66 IN | DIASTOLIC BLOOD PRESSURE: 70 MMHG | TEMPERATURE: 97.9 F

## 2024-05-09 DIAGNOSIS — T88.7XXA HYPERSENSITIVITY REACTION AT INJECTION SITE: Primary | ICD-10-CM

## 2024-05-09 DIAGNOSIS — M05.9 RHEUMATOID ARTHRITIS WITH POSITIVE RHEUMATOID FACTOR, INVOLVING UNSPECIFIED SITE (H): ICD-10-CM

## 2024-05-09 DIAGNOSIS — T80.89XA HYPERSENSITIVITY REACTION AT INJECTION SITE: Primary | ICD-10-CM

## 2024-05-09 PROCEDURE — 99213 OFFICE O/P EST LOW 20 MIN: CPT | Performed by: INTERNAL MEDICINE

## 2024-05-09 NOTE — PROGRESS NOTES
Assessment & Plan     Hannah was seen today for derm problem.    Diagnoses and all orders for this visit:    Hypersensitivity reaction at injection site  -     Adult Dermatology  Referral; Future    Rheumatoid arthritis with positive rheumatoid factor, involving unspecified site (H)  -     Adult Dermatology  Referral; Future    The injection site rash is more allergic in nature. I'm concerned that this may delayed hypersensitivity. Non cellulitic in appearance  Humira has potential to cause Jones Chris syndrome. I recommend not to get another dose until seen by dermatology before trying it again.     Skye Jo MD PhD      Subjective   Hannah is a 44 year old, presenting for the following health issues:  Derm Problem (Rash around injection site)        5/9/2024    12:45 PM   Additional Questions   Roomed by Mckenna ALMAZAN   Accompanied by Self       Had Humira injection on 4/26/2024. Initial rash (on her phone) had injection site punctum with mild redness and a ring of ecchymosis. Pt reported initially itching and felt hard. Over time, the itchiness and the hardness went away. She is let with this rash, no pain or itching now. Had a picture of the rash 2 days ago. Looked identical in shape. Difficult to tell intensity of the redness.     History of Present Illness       Reason for visit:  Per my Rheumatologist I need my Humira injection site checked  to make sure no infection.    She eats 2-3 servings of fruits and vegetables daily.She consumes 1 sweetened beverage(s) daily.She exercises with enough effort to increase her heart rate 10 to 19 minutes per day.  She exercises with enough effort to increase her heart rate 3 or less days per week.   She is taking medications regularly.         Rash  Onset/Duration: 4/30/2024  Description  Location: Right Upper Thigh   Character: round, raised, red  Itching: had itching but it has resolved.   Intensity:  moderate  Progression of Symptoms:   "worsening  Accompanying signs and symptoms:   Fever: No  Body aches or joint pain: YES - pt has regular body aches and states that they are about the same   Sore throat symptoms: No  Recent cold symptoms: No  History:           Previous episodes of similar rash: None  New exposures:  medication Humira   Recent travel: No  Exposure to similar rash: No  Precipitating or alleviating factors: First injection of Humira   Therapies tried and outcome: none        Review of Systems  Constitutional, HEENT, cardiovascular, pulmonary, gi and gu systems are negative, except as otherwise noted.      Objective    /70 (BP Location: Right arm, Patient Position: Sitting, Cuff Size: Adult Large)   Pulse 83   Temp 97.9  F (36.6  C) (Oral)   Resp 18   Ht 1.676 m (5' 6\")   Wt 119.3 kg (263 lb)   LMP 05/04/2024 (Exact Date)   SpO2 100%   BMI 42.45 kg/m    Body mass index is 42.45 kg/m .  Physical Exam   GENERAL: alert and no distress  Injection site: blanchable erythema papule, well demarcated.             Signed Electronically by: Skye Jo MD PhD    "

## 2024-05-09 NOTE — TELEPHONE ENCOUNTER
I called and spoke to Hannah. I scheduled her to see Marcelle in [place] tomorrow.     Grace Ivey RN on 5/9/2024 at 4:19 PM

## 2024-05-09 NOTE — TELEPHONE ENCOUNTER
[facility] Call Center    Phone Message    May a detailed message be left on voicemail: yes     Reason for Call: Other: Pt returning call about priority referral, please call back to schedule thanks!     Action Taken: Other: PH DERM     Travel Screening: Not Applicable

## 2024-05-10 ENCOUNTER — OFFICE VISIT (OUTPATIENT)
Dept: DERMATOLOGY | Facility: CLINIC | Age: 44
End: 2024-05-10
Payer: COMMERCIAL

## 2024-05-10 VITALS — WEIGHT: 263 LBS | HEIGHT: 66 IN | BODY MASS INDEX: 42.27 KG/M2

## 2024-05-10 DIAGNOSIS — T80.90XA INJECTION SITE REACTION, INITIAL ENCOUNTER: Primary | ICD-10-CM

## 2024-05-10 PROCEDURE — 99203 OFFICE O/P NEW LOW 30 MIN: CPT | Performed by: STUDENT IN AN ORGANIZED HEALTH CARE EDUCATION/TRAINING PROGRAM

## 2024-05-10 ASSESSMENT — PAIN SCALES - GENERAL: PAINLEVEL: NO PAIN (0)

## 2024-05-10 NOTE — PROGRESS NOTES
"[facility] Dermatology Note  Encounter Date: May 10, 2024  Office Visit     Reviewed patients past medical history and pertinent chart review prior to patients visit today.     Dermatology Problem List:  Likely injection site reaction from Humira (for RA)  -observation, improving    Family Hx: Grandpa-unknown type  Personal Hx: none  ____________________________________________    Assessment & Plan:     # Likely injection site reaction from Humira  -Reviewed with patient that the skin changes she has noticed is likely an injection site reaction from Humira which is a known side effect of the medication.  As area is now asymptomatic and she has no systemic symptoms, we can continue with observation.  The postinflammatory hyperpigmentation should continue to fade with time.  I will reach out to patient's rheumatology provider as they are the ones managing her Humira.  I am not sure if they will plan to switch therapies.  I can also reach out to her prescription management team to see what their thoughts are on continuing Humira versus discontinuing given her injection site reaction.  I will update the note accordingly.    Follow up: As needed for new or worsening conditions.    Marcelle Mondragon PA-C  [facility]  Dermatology    _______________________________________    CC: Derm Problem (Pt states, \"first injection of humira, day or two later, bruised and was red, then become itchy, let rheumatology know, then it got super red, didn't grow in size, doesn't itch anymore, some kind of reaction per NP\")    HPI:  Ms. Hannah Pacheco is a(n) 44 year old female who presents today as a new patient for a presumed injection site reaction on the right anterior thigh.  On 4/28/2024 patient injected the area with Humira.  This was patient's first Humira injection.  She was taking Humira for control of her rheumatoid arthritis.  The first 1 to 2 days following the injection, the site became erythematous " "and bruised.  A few days following, it became \"firm\" as well as pruritic for patient.  Since then, the site is no longer firm or symptomatic.  Patient denies systemic symptoms such as fevers, chills, difficulty breathing, shortness of breath or oral lesions.  She is here today for evaluation of the area.      Physical Exam:  SKIN: Focused examination of right anterior thigh was performed.  -Involving the right anterior thigh is an erythematous patch      - No other lesions of concern on areas examined.     Medications:  Current Outpatient Medications   Medication Sig Dispense Refill    ACETAMINOPHEN ER PO Take as needed      adalimumab (HUMIRA *CF*) 40 MG/0.4ML pen kit Inject 0.4 mLs (40 mg) Subcutaneous every 14 days Hold for signs of infection, then seek medical attention. 1 each 5    IBUPROFEN PO Taking as needed      Naproxen Sodium (ALEVE PO) Take 220 mg by mouth as needed for moderate pain      traZODone (DESYREL) 50 MG tablet Take 2 tablets (100 mg) by mouth at bedtime Start with 1 tablet at night and increase to 2 tablets if needed 180 tablet 1    vitamin D2 (ERGOCALCIFEROL) 60690 units (1250 mcg) capsule Take 1 capsule (50,000 Units) by mouth every 7 days 12 capsule 0     No current facility-administered medications for this visit.      Past Medical History:   Patient Active Problem List   Diagnosis    Plantar fascial fibromatosis    PFO (patent foramen ovale)    History of sepsis- blood clot and septic PICC, PFO found.  requesting records as they are archieved.     Hyperlipidemia LDL goal <130    MARGARETTE III (cervical intraepithelial neoplasia grade III) with severe dysplasia    RA (rheumatoid arthritis) (H)    Chronic fatigue syndrome    Morbid obesity (H)     Past Medical History:   Diagnosis Date    MARGARETTE III (cervical intraepithelial neoplasia grade III) with severe dysplasia 05    CKC, negative margins    NONSPECIFIC MEDICAL HISTORY     HPV/10-02    Normal delivery         Sepsis (H)     Stroke (H) "     Varicella without mention of complication     Chickenpox       CC Elizabeth Bradley, NP  [address] [place] [address] on close of this encounter.

## 2024-05-10 NOTE — LETTER
"    5/10/2024         RE: Hannah Pacheco  [address]  [place] [address]        Dear Colleague,    Thank you for referring your patient, Hannah Pacheco, to the [facility]. Please see a copy of my visit note below.    [facility] Dermatology Note  Encounter Date: May 10, 2024  Office Visit     Reviewed patients past medical history and pertinent chart review prior to patients visit today.     Dermatology Problem List:  Likely injection site reaction from Humira (for RA)  -observation, improving    Family Hx: Grandpa-unknown type  Personal Hx: none  ____________________________________________    Assessment & Plan:     # Likely injection site reaction from Humira  -Reviewed with patient that the skin changes she has noticed is likely an injection site reaction from Humira which is a known side effect of the medication.  As area is now asymptomatic and she has no systemic symptoms, we can continue with observation.  The postinflammatory hyperpigmentation should continue to fade with time.  I will reach out to patient's rheumatology provider as they are the ones managing her Humira.  I am not sure if they will plan to switch therapies.  I can also reach out to her prescription management team to see what their thoughts are on continuing Humira versus discontinuing given her injection site reaction.  I will update the note accordingly.    Follow up: As needed for new or worsening conditions.    Marcelle Mondragon PA-C  [facility]  Dermatology    _______________________________________    CC: Derm Problem (Pt states, \"first injection of humira, day or two later, bruised and was red, then become itchy, let rheumatology know, then it got super red, didn't grow in size, doesn't itch anymore, some kind of reaction per NP\")    HPI:  Ms. Hannah Pacheco is a(n) 44 year old female who presents today as a new patient for a presumed injection site reaction on the " "right anterior thigh.  On 4/28/2024 patient injected the area with Humira.  This was patient's first Humira injection.  She was taking Humira for control of her rheumatoid arthritis.  The first 1 to 2 days following the injection, the site became erythematous and bruised.  A few days following, it became \"firm\" as well as pruritic for patient.  Since then, the site is no longer firm or symptomatic.  Patient denies systemic symptoms such as fevers, chills, difficulty breathing, shortness of breath or oral lesions.  She is here today for evaluation of the area.      Physical Exam:  SKIN: Focused examination of right anterior thigh was performed.  -Involving the right anterior thigh is an erythematous patch      - No other lesions of concern on areas examined.     Medications:  Current Outpatient Medications   Medication Sig Dispense Refill     ACETAMINOPHEN ER PO Take as needed       adalimumab (HUMIRA *CF*) 40 MG/0.4ML pen kit Inject 0.4 mLs (40 mg) Subcutaneous every 14 days Hold for signs of infection, then seek medical attention. 1 each 5     IBUPROFEN PO Taking as needed       Naproxen Sodium (ALEVE PO) Take 220 mg by mouth as needed for moderate pain       traZODone (DESYREL) 50 MG tablet Take 2 tablets (100 mg) by mouth at bedtime Start with 1 tablet at night and increase to 2 tablets if needed 180 tablet 1     vitamin D2 (ERGOCALCIFEROL) 84215 units (1250 mcg) capsule Take 1 capsule (50,000 Units) by mouth every 7 days 12 capsule 0     No current facility-administered medications for this visit.      Past Medical History:   Patient Active Problem List   Diagnosis     Plantar fascial fibromatosis     PFO (patent foramen ovale)     History of sepsis- blood clot and septic PICC, PFO found.  requesting records as they are archieved.      Hyperlipidemia LDL goal <130     MARGARETTE III (cervical intraepithelial neoplasia grade III) with severe dysplasia     RA (rheumatoid arthritis) (H)     Chronic fatigue syndrome     " Morbid obesity (H)     Past Medical History:   Diagnosis Date     MARGARETTE III (cervical intraepithelial neoplasia grade III) with severe dysplasia 05    CKC, negative margins     NONSPECIFIC MEDICAL HISTORY     HPV/10-02     Normal delivery          Sepsis (H)      Stroke (H)      Varicella without mention of complication     Chickenpox       CC Elizabeth Bradley, NP  [address] [place] [address] on close of this encounter.       Again, thank you for allowing me to participate in the care of your patient.        Sincerely,        Marcelle Mondragon PA-C

## 2024-05-17 ENCOUNTER — VIRTUAL VISIT (OUTPATIENT)
Dept: RHEUMATOLOGY | Facility: CLINIC | Age: 44
End: 2024-05-17
Attending: NURSE PRACTITIONER
Payer: COMMERCIAL

## 2024-05-17 DIAGNOSIS — M45.8 ANKYLOSING SPONDYLITIS OF SACROCOCCYGEAL REGION (H): Primary | ICD-10-CM

## 2024-05-17 NOTE — Clinical Note
5/17/2024       RE: Hannah Pacheco  [address]  [place] [address]     Dear Colleague,    Thank you for referring your patient, Hannah Pacheco, to the [facility] [place] at [facility]. Please see a copy of my visit note below.    Medication Therapy Management (MTM) Encounter    ASSESSMENT:                            Medication Adherence/Access: No issues identified    Ankylosing Spondylitis:   ***      PLAN:                            Start Enbrel 50 mg subcutaneous injection every 7 days.      Follow-up: ***    SUBJECTIVE/OBJECTIVE:                          Hannah Pacheco is a 44 year old female called for a follow-up visit.       Reason for visit: Enbrel start.    Allergies/ADRs: Reviewed in chart  Past Medical History: Reviewed in chart  Tobacco: She reports that she has never smoked. She has never used smokeless tobacco.  Alcohol: none    Medication Adherence/Access: no issues reported    Ankylosing Spondylitis:   Naproxen 220 mg as needed     Left eye - veins behind eye are thin - doesn't think it's new - doesn't think inflamation     Experienced ISR with Humira - has resolved - slight disco  One dose and reacted a day or two later - itchy, raised, painful, hive-like, then hard, then very red - lasted over 2 weeks     Did not use anything to help     Supposed to take Humira dose on Sunday (last week)     Today's Vitals: LMP 05/04/2024 (Exact Date)   ----------------    I spent 20 minutes with this patient today. All changes were made via collaborative practice agreement with Eveline Trujillo NP. A copy of the visit note was provided to the patient's provider(s).    A summary of these recommendations was sent via [employer].    Franklin Jolley, Nasim  Medication Therapy Management Pharmacist  [facility]  Phone: [phone]     Telemedicine Visit Details  Type of service:  Telephone visit  Start  Time:  3:30 PM  End Time: 3:50 PM     Medication Therapy Recommendations  No medication therapy recommendations to display         Medication Therapy Management (MTM) Encounter    ASSESSMENT:                            Medication Adherence/Access: No issues identified    Ankylosing Spondylitis: Patient developed significant injection site reaction from Humira and presents to discuss alternative therapy. Provided education on Enbrel today including dosing, general administration, side effects (both common/serious), precautions, monitoring and time to efficacy. Enbrel has a very low risk of cross-reactivity with Humira and switching to alternative TNFi is appropriate. Encouraged patient to reach out with questions or concerns. Will follow-up in about a month to assess safety and efficacy as patient is getting started on Enbrel.     PLAN:                            Start Enbrel 50 mg subcutaneous injection every 7 days.      Follow-up: with MTM pharmacist on 6/21/24.     SUBJECTIVE/OBJECTIVE:                          Hannah Pacheco is a 44 year old female called for a follow-up visit.       Reason for visit: Enbrel start.    Allergies/ADRs: Reviewed in chart  Past Medical History: Reviewed in chart  Tobacco: She reports that she has never smoked. She has never used smokeless tobacco.  Alcohol: none    Medication Adherence/Access: no issues reported    Ankylosing Spondylitis:   Naproxen 220 mg as needed   Patient administered one dose of Humira and reported reacting a day or two later. Reaction presented as itchy, raised, painful bump that became hard then very red. Lasted over 2 weeks. Did not use any medications or products to help reaction resolve. Was evaluated by dermatology where it was determined to be an injection site reaction. Did not take most recent Humira dose. Reported veins behind left eye are thin, but no inflammation to her knowledge.     Today's Vitals: LMP 05/04/2024 (Exact Date)   ----------------    I  spent 20 minutes with this patient today. All changes were made via collaborative practice agreement with Eveline Trujillo NP. A copy of the visit note was provided to the patient's provider(s).    A summary of these recommendations was sent via [employer].    Franklin Jolley PharmD  Medication Therapy Management Pharmacist  [facility]  Phone: [phone]     Telemedicine Visit Details  Type of service:  Telephone visit  Start Time:  3:30 PM  End Time: 3:50 PM     Medication Therapy Recommendations  Ankylosing spondylitis of sacrococcygeal region (H)    Rationale: Undesirable effect - Adverse medication event - Safety   Recommendation: Change Medication - Stop Humira, Start Enbrel   Status: Accepted per CPA                    Again, thank you for allowing me to participate in the care of your patient.      Sincerely,    Franklin Jolley RPH

## 2024-05-17 NOTE — PROGRESS NOTES
Medication Therapy Management (MTM) Encounter    ASSESSMENT:                            Medication Adherence/Access: No issues identified    Ankylosing Spondylitis: Patient developed significant injection site reaction from Humira and presents to discuss alternative therapy. Provided education on Enbrel today including dosing, general administration, side effects (both common/serious), precautions, monitoring and time to efficacy. Enbrel has a very low risk of cross-reactivity with Humira and switching to alternative TNFi is appropriate. Encouraged patient to reach out with questions or concerns. Will follow-up in about a month to assess safety and efficacy as patient is getting started on Enbrel.     PLAN:                            Start Enbrel 50 mg subcutaneous injection every 7 days.      Follow-up: with MTM pharmacist on 6/21/24.     SUBJECTIVE/OBJECTIVE:                          Hannah Pacheco is a 44 year old female called for a follow-up visit.       Reason for visit: Enbrel start.    Allergies/ADRs: Reviewed in chart  Past Medical History: Reviewed in chart  Tobacco: She reports that she has never smoked. She has never used smokeless tobacco.  Alcohol: none    Medication Adherence/Access: no issues reported    Ankylosing Spondylitis:   Naproxen 220 mg as needed   Patient administered one dose of Humira and reported reacting a day or two later. Reaction presented as itchy, raised, painful bump that became hard then very red. Lasted over 2 weeks. Did not use any medications or products to help reaction resolve. Was evaluated by dermatology where it was determined to be an injection site reaction. Did not take most recent Humira dose. Reported veins behind left eye are thin, but no inflammation to her knowledge.     Today's Vitals: LMP 05/04/2024 (Exact Date)   ----------------    I spent 20 minutes with this patient today. All changes were made via collaborative practice agreement with Eveline Trujillo NP. A  copy of the visit note was provided to the patient's provider(s).    A summary of these recommendations was sent via [employer].    Yuri HeartD  Medication Therapy Management Pharmacist  [facility]  Phone: [phone]     Telemedicine Visit Details  Type of service:  Telephone visit  Start Time:  3:30 PM  End Time: 3:50 PM     Medication Therapy Recommendations  Ankylosing spondylitis of sacrococcygeal region (H)    Rationale: Undesirable effect - Adverse medication event - Safety   Recommendation: Change Medication - Stop Humira, Start Enbrel   Status: Accepted per CPA

## 2024-05-20 ENCOUNTER — TELEPHONE (OUTPATIENT)
Dept: RHEUMATOLOGY | Facility: CLINIC | Age: 44
End: 2024-05-20
Payer: COMMERCIAL

## 2024-05-20 NOTE — TELEPHONE ENCOUNTER
PA Initiation    Medication: ENBREL SURECLICK 50 MG/ML SC SOAJ  Insurance Company: [employer] - Phone [phone] Fax [phone]  Pharmacy Filling the Rx: [facility] - [place], [place] - [facility] [address]  Filling Pharmacy Phone:    Filling Pharmacy Fax:    Start Date: 5/20/2024     JONNA ZIEGLER (Baum: [id number])

## 2024-05-22 NOTE — TELEPHONE ENCOUNTER
Prior Authorization Approval    Medication: ENBREL SURECLICK 50 MG/ML SC SOAJ  Authorization Effective Date: 5/22/2024  Authorization Expiration Date: 5/21/2025  Approved Dose/Quantity: 4mL  Reference #: [id number]   Insurance Company: [employer] - Phone [phone] Fax [phone]  Expected CoPay: $    CoPay Card Available: No    Financial Assistance Needed: NA  Which Pharmacy is filling the prescription: [facility] - [place] [address] [address]  Pharmacy Notified: yes  Patient Notified: yes

## 2024-06-03 NOTE — PATIENT INSTRUCTIONS
"Recommendations from today's MTM visit:                                                       Start Enbrel 50 mg subcutaneous injection every 7 days.      Follow-up: with MTM pharmacist on 6/21/24.     It was great speaking with you today.  I value your experience and would be very thankful for your time in providing feedback in our clinic survey. In the next few days, you may receive an email or text message from [facility] [employer] with a link to a survey related to your  clinical pharmacist.\"     To schedule another MTM appointment, please call the clinic directly or you may call the MTM scheduling line at [phone].    My Clinical Pharmacist's contact information:                                                      Please feel free to contact me with any questions or concerns you have.      Franklin Jolley, PharmD  Medication Therapy Management Pharmacist  [facility]  Phone: [phone]    "

## 2024-06-11 DIAGNOSIS — M45.9 ANKYLOSING SPONDYLITIS (H): Primary | ICD-10-CM

## 2024-06-11 RX ORDER — SECUKINUMAB 150 MG/ML
150 INJECTION SUBCUTANEOUS
Qty: 1 ML | Refills: 4 | Status: SHIPPED | OUTPATIENT
Start: 2024-06-11 | End: 2024-06-12

## 2024-06-11 RX ORDER — SECUKINUMAB 150 MG/ML
150 INJECTION SUBCUTANEOUS WEEKLY
Qty: 5 ML | Refills: 0 | Status: SHIPPED | OUTPATIENT
Start: 2024-06-11 | End: 2024-06-12

## 2024-06-11 NOTE — PROGRESS NOTES
Patient experiencing worsening reactions with Enbrel injections. Developed redness, itching, and raised, hard, welted skin. Similar to reaction from Humira and second injection more intense than first. Discussed with provider who recommended switching to IL-17 inhibitor, Cosentyx. Ordered Cosentyx load + maintenance and informed patient via MyChart. Offered follow-up to discuss medication.     Franklin Jolley, PharmD  Medication Therapy Management Pharmacist  [facility]

## 2024-06-12 ENCOUNTER — TELEPHONE (OUTPATIENT)
Dept: RHEUMATOLOGY | Facility: CLINIC | Age: 44
End: 2024-06-12
Payer: COMMERCIAL

## 2024-06-12 DIAGNOSIS — M45.9 ANKYLOSING SPONDYLITIS (H): ICD-10-CM

## 2024-06-12 RX ORDER — SECUKINUMAB 150 MG/ML
150 INJECTION SUBCUTANEOUS WEEKLY
Qty: 5 ML | Refills: 0 | Status: SHIPPED | OUTPATIENT
Start: 2024-06-12 | End: 2024-09-24

## 2024-06-12 RX ORDER — SECUKINUMAB 150 MG/ML
150 INJECTION SUBCUTANEOUS
Qty: 1 ML | Refills: 4 | Status: SHIPPED | OUTPATIENT
Start: 2024-06-12 | End: 2024-09-24

## 2024-06-12 NOTE — TELEPHONE ENCOUNTER
Hospital Medicine History & Physical      PCP: No primary care provider on file. Date of Admission: 4/28/2021    Date of Service: Pt seen/examined on 4/28/21 and Admitted to Inpatient     Chief Complaint:  Left flank pain, lower abdominal pain , fever      History Of Present Illness: The patient is a 23 y.o. female who presents to [facility] ([facility])  With lower abdomen pain, left flank pain for last 4 days. Associated with fever for last two days. She was in urgent care yesterday for similar complaints. quinn was diagnosed with PID and was d/c after ceftriaxone, doxy anf flagyl. She continues to feel uncomfortable  And presented here today. Denying nay vaginal discharge, no urinary symptoms. She got IUD placed in November , since then she has intermittent spotting. CT done here with left pyelonephritis , suspicion for  Renal infarct. ER discussed with Radiology, interventional radiology and urology. Per IR Less likely infarct. Past Medical History:    No past medical history on file. Past Surgical History:    No past surgical history on file. Medications Prior to Admission:    Prior to Admission medications    Medication Sig Start Date End Date Taking? Authorizing Provider   metroNIDAZOLE (FLAGYL) 500 MG tablet Take 500 mg by mouth 2 times daily   Yes Historical Provider, MD   doxycycline hyclate (VIBRAMYCIN) 100 MG capsule Take 100 mg by mouth daily   Yes Historical Provider, MD       Allergies:  Pcn [penicillins]    Social History:  The patient currently lives at home with family    TOBACCO:   reports that she has never smoked. She has never used smokeless tobacco.  ETOH:   reports current alcohol use. Family History:  Reviewed in detail and negative for DM, Early CAD, Cancer, CVA. Positive as follows:    No family history on file.     REVIEW OF SYSTEMS: PA Initiation    Medication: COSENTYX SENSOREADY  MG/ML SC SOAJ  Insurance Company: [employer] [place] - Phone [phone] Fax [phone]  Pharmacy Filling the Rx: [facility] - [place], [place] - [address]  Filling Pharmacy Phone:    Filling Pharmacy Fax:    Start Date: 6/11/2024     [id number])      JEWELS Fan, [facility]  Specialty Pharmacy Clinic Liaison     [facility]    [email or web address]     Phone: [phone]  Fax: [phone]         Positive  And negative  noted in the HPI. All other systems reviewed and negative. PHYSICAL EXAM:    /87   Pulse 88   Temp (!) 100.5 °F (38.1 °C)   Resp 20   Ht 5' 7\" (1.702 m)   Wt 120 lb (54.4 kg)   SpO2 (!) 87%   BMI 18.79 kg/m²     General appearance: No apparent distress appears stated age and cooperative. HEENT Normal cephalic, atraumatic without obvious deformity. Pupils equal, round, and reactive to light. Extra ocular muscles intact. Conjunctivae/corneas clear. Neck: Supple, No jugular venous distention/bruits. Trachea midline without thyromegaly or adenopathy with full range of motion. Lungs: Clear to auscultation, bilaterally without Rales/Wheezes/Rhonchi with good respiratory effort. Heart: Regular rate and rhythm with Normal S1/S2 without murmurs, rubs or gallops, point of maximum impulse non-displaced  Abdomen: Soft, non-tender or non-distended without rigidity or guarding and positive bowel sounds all four quadrants. Mile left CVA tenderness  Extremities: No clubbing, cyanosis, or edema bilaterally. Full range of motion without deformity and normal gait intact. Skin: Skin color, texture, turgor normal.  No rashes or lesions. Neurologic: Alert and oriented X 3, neurovascularly intact with sensory/motor intact upper extremities/lower extremities, bilaterally. Cranial nerves: II-XII intact, grossly non-focal.  Mental status: Alert, oriented, thought content appropriate.   Capillary Refill: Acceptable  < 3 seconds  Peripheral Pulses: +3 Easily felt, not easily obliterated with pressure      CXR:  I have reviewed the CXR with the following interpretation: normal study  EKG:  I have reviewed the EKG with the following interpretation: pending   CBC   Recent Labs     04/28/21  1102   WBC 18.9*   HGB 12.0   HCT 35.3*         RENAL  Recent Labs     04/28/21  1102   *   K 3.3*      CO2 21   BUN 8   CREATININE 0.7     LFT'S  Recent Labs     04/28/21  1102   AST 15 ALT 6*   BILIDIR <0.2   BILITOT 0.6   ALKPHOS 74     COAG  No results for input(s): INR in the last 72 hours. CARDIAC ENZYMES  No results for input(s): CKTOTAL, CKMB, CKMBINDEX, TROPONINI in the last 72 hours. U/A:    Lab Results   Component Value Date    COLORU Yellow 04/28/2021    WBCUA 3-5 04/28/2021    RBCUA 0-2 04/28/2021    BACTERIA Rare 04/28/2021    CLARITYU Clear 04/28/2021    SPECGRAV <=1.005 04/28/2021    LEUKOCYTESUR TRACE 04/28/2021    BLOODU SMALL 04/28/2021    GLUCOSEU Negative 04/28/2021       ABG  No results found for: BTL3FCG, BEART, V3QUEVDL, PHART, THGBART, BKG7JHJ, PO2ART, CAM9HKW        There are no active hospital problems to display for this patient. PHYSICIANS CERTIFICATION:    I certify that Alfredo Rivera is expected to be hospitalized for more  than 2 midnights based on the following assessment and plan:      ASSESSMENT/PLAN:    Acute pyelonephritis:-  Started on iv abx  Obtain urine cx  Check urine for STDs  Pain control  Gentle iv fluids    Hx of allergies  Continue home meds     Hypokalemia/ hypomagnesemia  Replace as needed    covid rule out  Test pending  She received J and J vaccine  On 4/6    DVT Prophylaxis: lovenox  Diet: No diet orders on file  Code Status: No Order  PT/OT Eval Status: Jaswant Alston MD    Thank you No primary care provider on file. for the opportunity to be involved in this patient's care. If you have any questions or concerns please feel free to contact me at [phone].

## 2024-06-12 NOTE — PROGRESS NOTES
Reordering medication from correct department. Patient experiencing worsening reactions with Enbrel injections. Developed redness, itching, and raised, hard, welted skin. Similar to reaction from Humira and second injection more intense than first. Discussed with provider who recommended switching to IL-17 inhibitor, Cosentyx. Ordered Cosentyx load + maintenance and informed patient via MyChart. Offered follow-up to discuss medication.      Franklin Jolley, PharmD  Medication Therapy Management Pharmacist  [facility]

## 2024-06-12 NOTE — TELEPHONE ENCOUNTER
PA Needed    Medication: Cosentyx 150mg/ml (2 pack) Sensoready Pen  QTY/DS: 4 ml/ 28 ds  NEW INS: No, pt had reaction to last med  Insurance Company:  [employer]  Pharmacy Filling the Rx:  [facility]  PA :  NA  Date of last fill: NA      Thanks,   Vanessa Jackson [facility]  Patient   [facility]  [address] [place] [address] [phone]

## 2024-06-13 ENCOUNTER — VIRTUAL VISIT (OUTPATIENT)
Dept: RHEUMATOLOGY | Facility: CLINIC | Age: 44
End: 2024-06-13
Attending: NURSE PRACTITIONER
Payer: COMMERCIAL

## 2024-06-13 DIAGNOSIS — M45.9 ANKYLOSING SPONDYLITIS (H): Primary | ICD-10-CM

## 2024-06-13 NOTE — Clinical Note
6/13/2024       RE: Hannah Pacheco  [address]  [place] [address]     Dear Colleague,    Thank you for referring your patient, Hannah aPcheco, to the [facility] [place] at [facility]. Please see a copy of my visit note below.    Medication Therapy Management (MTM) Encounter    ASSESSMENT:                            Medication Adherence/Access: No issues identified    Ankylosing Spondylitis:  Provided education       PLAN:                            Start    Follow-up: with MTM pharmacist on 7/23/24.    SUBJECTIVE/OBJECTIVE:                          Hannah Pacheco is a 44 year old female seen for a follow-up visit.       Reason for visit: Cosentyx start.    Allergies/ADRs: Reviewed in chart  Past Medical History: Reviewed in chart  Tobacco: She reports that she has never smoked. She has never used smokeless tobacco.  Alcohol: none    Medication Adherence/Access: no issues reported    Ankylosing Spondylitis:   Enbrel 50 mg subcutaneous injection every 7 days  Naproxen 220 mg as needed        Load denied - start with maintenance     Education     Enbrel was worse reaction than Humira -         Today's Vitals: LMP 05/04/2024 (Exact Date)   ----------------    I spent 15 minutes with this patient today. All changes were made via collaborative practice agreement with Eveline Trujillo NP. A copy of the visit note was provided to the patient's provider(s).    A summary of these recommendations was sent via [employer].    Franklin Jolley, PharmD  Medication Therapy Management Pharmacist  [facility]  Phone: [phone]     Telemedicine Visit Details  Type of service:  Telephone visit  Start Time:  3:30 PM  End Time:  3:45 PM     Medication Therapy Recommendations  No medication therapy recommendations to display         Medication Therapy Management (MTM) Encounter    ASSESSMENT:                             Medication Adherence/Access: No issues identified    Ankylosing Spondylitis: Patient experienced injection site reactions from both Humira and Enbrel. Plan to switch therapy and will avoid TNFi class. Discussed IL-17 inhibitors with provider who agreed to start. Provided education on Cosentyx including dosing, general administration, side effects (both common/serious), precautions, monitoring and time to efficacy. Discussed data on malignancy and risk of serious infection in depth. Discussed potential need to hold therapy in the setting of signs/symptoms of active infection.     PLAN:                            Start Cosentyx 150 mg subcutaneous injection every 28 days.   The loading dose was denied, so we will proceed with the maintenance dose.     Complete routine lab monitoring every 3 months.   Due in September.     Follow-up: with MT pharmacist on 7/23/24.    SUBJECTIVE/OBJECTIVE:                          Hannah Pacheco is a 44 year old female seen for a follow-up visit.       Reason for visit: Cosentyx start.    Allergies/ADRs: Reviewed in chart  Past Medical History: Reviewed in chart  Tobacco: She reports that she has never smoked. She has never used smokeless tobacco.  Alcohol: none    Medication Adherence/Access: no issues reported    Ankylosing Spondylitis:   Naproxen 220 mg as needed   Patient experienced injection site reaction with first 2 Enbrel doses and presents to discuss alternative therapy. Reported first injection was on 5/30/24. Noticed red, itchy, hard welt at the injection site that slowly improved. Administered second dose 6/6/24 and developed more significant reaction. Experienced itching, warmth, and redness that spread across entire thigh. Used hydrocortisone cream and Benadryl which helped with the itching. Feels Enbrel reaction was worse than Humira and ready to try an alternative medication.      CBC RESULTS:   Recent Labs   Lab Test 06/17/24  0801   WBC 6.7   RBC 4.11   HGB 13.0    HCT 38.9   MCV 95   MCH 31.6   MCHC 33.4   RDW 12.7        Liver Function Studies -   Recent Labs   Lab Test 06/17/24  0801 02/28/24  1443 06/14/16  1633   PROTTOTAL  --   --  6.4*   ALBUMIN 4.0   < > 3.5   BILITOTAL  --   --  0.3   ALKPHOS  --   --  55   AST 16   < > 26   ALT 12   < > 22    < > = values in this interval not displayed.      Today's Vitals: LMP 05/04/2024 (Exact Date)   ----------------    I spent 15 minutes with this patient today. All changes were made via collaborative practice agreement with Eveline Trujillo NP. A copy of the visit note was provided to the patient's provider(s).    A summary of these recommendations was sent via [employer].    Franklin Jolley PharmD  Medication Therapy Management Pharmacist  [facility]  Phone: [phone]     Telemedicine Visit Details  Type of service:  Telephone visit  Start Time:  3:30 PM  End Time:  3:45 PM     Medication Therapy Recommendations  No medication therapy recommendations to display           Again, thank you for allowing me to participate in the care of your patient.      Sincerely,    Franklin Jolley RPH

## 2024-06-13 NOTE — TELEPHONE ENCOUNTER
Prior Authorization Approval      MAINTENANCE ONLY-- per plan        Medication: COSENTYX SENSOREADY  MG/ML SC SOAJ  Authorization Effective Date: 6/13/2024  Authorization Expiration Date: 6/13/2025  Approved Dose/Quantity: 1mL-28days  Reference #: [id number]   [facility] - Phone [phone] Fax [phone]  Expected CoPay: $    CoPay Card Available: Yes    Financial Assistance Needed:   Which Pharmacy is filling the prescription: [facility] - [place], [place] - [address]

## 2024-06-13 NOTE — PROGRESS NOTES
Medication Therapy Management (MTM) Encounter    ASSESSMENT:                            Medication Adherence/Access: No issues identified    Ankylosing Spondylitis: Patient experienced injection site reactions from both Humira and Enbrel. Plan to switch therapy and will avoid TNFi class. Discussed IL-17 inhibitors with provider who agreed to start. Provided education on Cosentyx including dosing, general administration, side effects (both common/serious), precautions, monitoring and time to efficacy. Discussed data on malignancy and risk of serious infection in depth. Discussed potential need to hold therapy in the setting of signs/symptoms of active infection. Cosentyx loading dose denied by insurance due to quantity limits. Unlikely to approve appeal and will continue with maintenance dose. Encouraged patient to reach out with any concerns.     PLAN:                            Start Cosentyx 150 mg subcutaneous injection every 28 days.   The loading dose was denied, so we will proceed with the maintenance dose.     Complete routine lab monitoring every 3 months.   Due in September.     Follow-up: with MTM pharmacist on 7/23/24.    SUBJECTIVE/OBJECTIVE:                          Hannah Pacheco is a 44 year old female seen for a follow-up visit.       Reason for visit: Cosentyx start.    Allergies/ADRs: Reviewed in chart  Past Medical History: Reviewed in chart  Tobacco: She reports that she has never smoked. She has never used smokeless tobacco.  Alcohol: none    Medication Adherence/Access: no issues reported    Ankylosing Spondylitis:   Naproxen 220 mg as needed   Patient experienced injection site reaction with first 2 Enbrel doses and presents to discuss alternative therapy. Reported first injection was on 5/30/24. Noticed red, itchy, hard welt at the injection site that slowly improved. Administered second dose 6/6/24 and developed more significant reaction. Experienced itching, warmth, and redness that spread  across entire thigh. Used hydrocortisone cream and Benadryl which helped with the itching. Feels Enbrel reaction was worse than Humira and ready to try an alternative medication.      CBC RESULTS:   Recent Labs   Lab Test 06/17/24  0801   WBC 6.7   RBC 4.11   HGB 13.0   HCT 38.9   MCV 95   MCH 31.6   MCHC 33.4   RDW 12.7        Liver Function Studies -   Recent Labs   Lab Test 06/17/24  0801 02/28/24  1443 06/14/16  1633   PROTTOTAL  --   --  6.4*   ALBUMIN 4.0   < > 3.5   BILITOTAL  --   --  0.3   ALKPHOS  --   --  55   AST 16   < > 26   ALT 12   < > 22    < > = values in this interval not displayed.      Today's Vitals: LMP 05/04/2024 (Exact Date)   ----------------    I spent 15 minutes with this patient today. All changes were made via collaborative practice agreement with Eveline Trujillo NP. A copy of the visit note was provided to the patient's provider(s).    A summary of these recommendations was sent via [employer].    Franklin Jolley, PharmD  Medication Therapy Management Pharmacist  [facility]  Phone: [phone]     Telemedicine Visit Details  Type of service:  Telephone visit  Start Time:  3:30 PM  End Time:  3:45 PM     Medication Therapy Recommendations  Ankylosing spondylitis (H)    Rationale: Untreated condition - Needs additional medication therapy - Indication   Recommendation: Start Medication - Start Cosentyx   Status: Accepted per CPA

## 2024-06-17 ENCOUNTER — LAB (OUTPATIENT)
Dept: LAB | Facility: CLINIC | Age: 44
End: 2024-06-17
Payer: COMMERCIAL

## 2024-06-17 DIAGNOSIS — M45.8 ANKYLOSING SPONDYLITIS OF SACROCOCCYGEAL REGION (H): ICD-10-CM

## 2024-06-17 DIAGNOSIS — E55.9 VITAMIN D DEFICIENCY: ICD-10-CM

## 2024-06-17 LAB
ALBUMIN SERPL BCG-MCNC: 4 G/DL (ref 3.5–5.2)
ALT SERPL W P-5'-P-CCNC: 12 U/L (ref 0–50)
AST SERPL W P-5'-P-CCNC: 16 U/L (ref 0–45)
CREAT SERPL-MCNC: 0.75 MG/DL (ref 0.51–0.95)
CRP SERPL-MCNC: 6.18 MG/L
EGFRCR SERPLBLD CKD-EPI 2021: >90 ML/MIN/1.73M2
ERYTHROCYTE [DISTWIDTH] IN BLOOD BY AUTOMATED COUNT: 12.7 % (ref 10–15)
ERYTHROCYTE [SEDIMENTATION RATE] IN BLOOD BY WESTERGREN METHOD: 6 MM/HR (ref 0–20)
HCT VFR BLD AUTO: 38.9 % (ref 35–47)
HGB BLD-MCNC: 13 G/DL (ref 11.7–15.7)
MCH RBC QN AUTO: 31.6 PG (ref 26.5–33)
MCHC RBC AUTO-ENTMCNC: 33.4 G/DL (ref 31.5–36.5)
MCV RBC AUTO: 95 FL (ref 78–100)
PLATELET # BLD AUTO: 257 10E3/UL (ref 150–450)
RBC # BLD AUTO: 4.11 10E6/UL (ref 3.8–5.2)
VIT D+METAB SERPL-MCNC: 27 NG/ML (ref 20–50)
WBC # BLD AUTO: 6.7 10E3/UL (ref 4–11)

## 2024-06-17 PROCEDURE — 85027 COMPLETE CBC AUTOMATED: CPT

## 2024-06-17 PROCEDURE — 82306 VITAMIN D 25 HYDROXY: CPT

## 2024-06-17 PROCEDURE — 82040 ASSAY OF SERUM ALBUMIN: CPT

## 2024-06-17 PROCEDURE — 85652 RBC SED RATE AUTOMATED: CPT

## 2024-06-17 PROCEDURE — 84450 TRANSFERASE (AST) (SGOT): CPT

## 2024-06-17 PROCEDURE — 84460 ALANINE AMINO (ALT) (SGPT): CPT

## 2024-06-17 PROCEDURE — 82565 ASSAY OF CREATININE: CPT

## 2024-06-17 PROCEDURE — 86140 C-REACTIVE PROTEIN: CPT

## 2024-06-17 PROCEDURE — 36415 COLL VENOUS BLD VENIPUNCTURE: CPT

## 2024-06-18 NOTE — PATIENT INSTRUCTIONS
"Recommendations from today's MTM visit:                                                       Start Cosentyx 150 mg subcutaneous injection every 28 days.   The loading dose was denied, so we will proceed with the maintenance dose.     Complete routine lab monitoring every 3 months.   Due in September.     Follow-up: with MTM pharmacist on 7/23/24.    It was great speaking with you today.  I value your experience and would be very thankful for your time in providing feedback in our clinic survey. In the next few days, you may receive an email or text message from [employer] with a link to a survey related to your  clinical pharmacist.\"     To schedule another MTM appointment, please call the clinic directly or you may call the MTM scheduling line at [phone].    My Clinical Pharmacist's contact information:                                                      Please feel free to contact me with any questions or concerns you have.      Franklin Jolley, PharmD  Medication Therapy Management Pharmacist  [facility]  Phone: [phone]    "

## 2024-07-02 ENCOUNTER — OFFICE VISIT (OUTPATIENT)
Dept: OTOLARYNGOLOGY | Facility: CLINIC | Age: 44
End: 2024-07-02
Payer: COMMERCIAL

## 2024-07-02 ENCOUNTER — PRE VISIT (OUTPATIENT)
Dept: OTOLARYNGOLOGY | Facility: CLINIC | Age: 44
End: 2024-07-02

## 2024-07-02 VITALS — HEIGHT: 66 IN | BODY MASS INDEX: 42.45 KG/M2

## 2024-07-02 DIAGNOSIS — K13.0 MUCOCELE OF LOWER LIP: ICD-10-CM

## 2024-07-02 PROCEDURE — 88305 TISSUE EXAM BY PATHOLOGIST: CPT | Mod: TC | Performed by: OTOLARYNGOLOGY

## 2024-07-02 PROCEDURE — 88305 TISSUE EXAM BY PATHOLOGIST: CPT | Mod: 26 | Performed by: PATHOLOGY

## 2024-07-02 PROCEDURE — 99203 OFFICE O/P NEW LOW 30 MIN: CPT | Mod: 25 | Performed by: OTOLARYNGOLOGY

## 2024-07-02 PROCEDURE — 40490 BIOPSY OF LIP: CPT | Performed by: OTOLARYNGOLOGY

## 2024-07-02 ASSESSMENT — PAIN SCALES - GENERAL: PAINLEVEL: NO PAIN (0)

## 2024-07-02 NOTE — LETTER
2024       RE: Hannah Pacheco  [address]  [place] [address]     Dear Colleague,    Thank you for referring your patient, Hannah Pacheco, to the [facility] [place] at [facility]. Please see a copy of my visit note below.      Otolaryngology Clinic      Name: Hannah Pacheco  MRN: [id number]  Age: 44 year old  : 1980  Referring provider: Elizabeth Bradley  2024      Chief Complaint:  Consultation    History of Present Illness:   Hannah Pacheco is a 44 year old female with a history of Rheumatoid Arthritis who presents for consultation regarding a mucocele of the lower lip. She reports the mucocele being small in size when she first noticed it. However, it has grown in size and has now plateaued. Here today for a potential removal. Denies any pain to the area.     Active Medications:     Current Outpatient Medications:      ACETAMINOPHEN ER PO, Take as needed, Disp: , Rfl:      IBUPROFEN PO, Taking as needed, Disp: , Rfl:      Naproxen Sodium (ALEVE PO), Take 220 mg by mouth as needed for moderate pain, Disp: , Rfl:      secukinumab (COSENTYX SENSOREADY PEN) 150 MG/ML Sensoready pen, Inject 1 mL (150 mg) Subcutaneous every 28 days Hold for signs of infection, and seek medical attention., Disp: 1 mL, Rfl: 4     traZODone (DESYREL) 50 MG tablet, Take 2 tablets (100 mg) by mouth at bedtime Start with 1 tablet at night and increase to 2 tablets if needed, Disp: 180 tablet, Rfl: 1     vitamin D2 (ERGOCALCIFEROL) 26132 units (1250 mcg) capsule, Take 1 capsule (50,000 Units) by mouth every 7 days, Disp: 12 capsule, Rfl: 0     secukinumab (COSENTYX SENSOREADY PEN) 150 MG/ML Sensoready pen, Inject 1 mL (150 mg) Subcutaneous once a week At weeks 0, 1, 2, 3, and 4 and every 4 weeks there after loading dose., Disp: 5 mL, Rfl: 0      Allergies:   Blood-group specific substance and No known drug allergy     "  Past Medical History:  Past Medical History:   Diagnosis Date     MARGARETTE III (cervical intraepithelial neoplasia grade III) with severe dysplasia 05    CKC, negative margins     NONSPECIFIC MEDICAL HISTORY     HPV/10-02     Normal delivery          Sepsis (H)      Stroke (H)      Varicella without mention of complication     Chickenpox     Patient Active Problem List   Diagnosis     Plantar fascial fibromatosis     PFO (patent foramen ovale)     History of sepsis- blood clot and septic PICC, PFO found.  requesting records as they are archieved.      Hyperlipidemia LDL goal <130     MARGARETTE III (cervical intraepithelial neoplasia grade III) with severe dysplasia     RA (rheumatoid arthritis) (H)     Chronic fatigue syndrome     Morbid obesity (H)        Past Surgical History:  Past Surgical History:   Procedure Laterality Date     HC CONIZATION CERVIX,KNIFE/LASER  10/04/2005    Cold knife conization of cervix and endometrial biopsy.     TUBAL LIGATION         Family History:   Family History   Problem Relation Age of Onset     Respiratory Brother         Asthma     Cancer No family hx of      Diabetes No family hx of      Heart Disease No family hx of      Hypertension No family hx of      Lipids No family hx of          Social History:   Social History     Tobacco Use     Smoking status: Never     Smokeless tobacco: Never     Tobacco comments:     no smokers in household   Vaping Use     Vaping status: Never Used   Substance Use Topics     Alcohol use: Yes     Alcohol/week: 0.0 standard drinks of alcohol     Comment: wine cooler x2/3-4x per yr     Drug use: No       Review of Systems:   Pertinent items are noted in HPI or as in patient entered ROS below, remainder of complete ROS is negative.        No data to display                  Physical Exam:   Ht 1.676 m (5' 6\")   LMP 2024 (Exact Date)   BMI 42.45 kg/m       Constitutional:  The patient was unaccompanied, well-groomed, and in no acute distress.  "   Skin:  Warm and pink.    Neurologic:  Alert and oriented x 3.  CN's III-XII within normal limits.  Voice normal.   Psychiatric:  The patient's affect was calm, cooperative, and appropriate.    Respiratory:  Breathing comfortably without stridor or exertion of accessory muscles.    Eyes: Extraocular movement intact.    Head:  Normocephalic and atraumatic.  No lesions or scars.    Ears:  Pinnae and tragus non-tender.  EAC's and TM's were clear.   Nose:  Sinuses were non-tender.  Anterior rhinoscopy revealed midline septum and absence of purulence or polyps.    OC/OP:  Normal tongue, floor of mouth, buccal mucosa, and palate. Small fibroma of the lower lip. No abnormal lymph tissue in the oropharynx.  The pterygoid region is non-tender.  Right lip m,ass.   Neck:  Supple with normal laryngeal and tracheal landmarks.  The parotid beds were without masses.  No palpable thyroid.  Lymphatic:  There is no palpable lymphadenopathy in the neck.     Procedure: excision of oropharynx lesion I went ahead and anesthetized the right lip lesion.    Anesthetic: 1cc of 1% lidocaine injected into lip     After informed consent was obtained. Anesthetic was administer. The lip lesion was grasped with forceps and sharply excised. We will send for routine pathology. Silver nitrate was used for cauterization at the base of the lesion. The patient tolerated the procedure well without concerns.      Assessment and Plan:    ICD-10-CM    1. Mucocele of lower lip  K13.0 Pediatric ENT  Referral         44 year old female with a history of Rheumatoid Arthritis who presents for consultation regarding a mucocele of the lower lip. Upon examination, I believe the area to actually be a small fibroma of the lower lip.  There was three 4-0 chromic sutures used to close the lip.    Follow-up: No follow-ups on file.         Scribe Disclosure:   Dario NEAL, am serving as a scribe; to document services personally performed by Mitchell Wilkins  MD Deysi -based on data collection and the provider's statements to me.     Provider Disclosure:  I agree with above History, Review of Systems, Physical exam and Plan.  I have reviewed the content of the documentation and have edited it as needed. I have personally performed the services documented here and the documentation accurately represents those services and the decisions I have made.      Electronically signed by:          Again, thank you for allowing me to participate in the care of your patient.      Sincerely,    Mitchell Jo MD

## 2024-07-02 NOTE — PROGRESS NOTES
Otolaryngology Clinic      Name: Hannah Pacheco  MRN: [id number]  Age: 44 year old  : 1980  Referring provider: Elizabeth Bradley  2024      Chief Complaint:  Consultation    History of Present Illness:   Hannah Pacheco is a 44 year old female with a history of Rheumatoid Arthritis who presents for consultation regarding a mucocele of the lower lip. She reports the mucocele being small in size when she first noticed it. However, it has grown in size and has now plateaued. Here today for a potential removal. Denies any pain to the area.     Active Medications:     Current Outpatient Medications:     ACETAMINOPHEN ER PO, Take as needed, Disp: , Rfl:     IBUPROFEN PO, Taking as needed, Disp: , Rfl:     Naproxen Sodium (ALEVE PO), Take 220 mg by mouth as needed for moderate pain, Disp: , Rfl:     secukinumab (COSENTYX SENSOREADY PEN) 150 MG/ML Sensoready pen, Inject 1 mL (150 mg) Subcutaneous every 28 days Hold for signs of infection, and seek medical attention., Disp: 1 mL, Rfl: 4    traZODone (DESYREL) 50 MG tablet, Take 2 tablets (100 mg) by mouth at bedtime Start with 1 tablet at night and increase to 2 tablets if needed, Disp: 180 tablet, Rfl: 1    vitamin D2 (ERGOCALCIFEROL) 81263 units (1250 mcg) capsule, Take 1 capsule (50,000 Units) by mouth every 7 days, Disp: 12 capsule, Rfl: 0    secukinumab (COSENTYX SENSOREADY PEN) 150 MG/ML Sensoready pen, Inject 1 mL (150 mg) Subcutaneous once a week At weeks 0, 1, 2, 3, and 4 and every 4 weeks there after loading dose., Disp: 5 mL, Rfl: 0      Allergies:   Blood-group specific substance and No known drug allergy      Past Medical History:  Past Medical History:   Diagnosis Date    MARGARETTE III (cervical intraepithelial neoplasia grade III) with severe dysplasia 05    CKC, negative margins    NONSPECIFIC MEDICAL HISTORY     HPV/10-02    Normal delivery         Sepsis (H)     Stroke (H)     Varicella without mention of complication     Chickenpox  "    Patient Active Problem List   Diagnosis    Plantar fascial fibromatosis    PFO (patent foramen ovale)    History of sepsis- blood clot and septic PICC, PFO found.  requesting records as they are archieved.     Hyperlipidemia LDL goal <130    MARGARETTE III (cervical intraepithelial neoplasia grade III) with severe dysplasia    RA (rheumatoid arthritis) (H)    Chronic fatigue syndrome    Morbid obesity (H)        Past Surgical History:  Past Surgical History:   Procedure Laterality Date    HC CONIZATION CERVIX,KNIFE/LASER  10/04/2005    Cold knife conization of cervix and endometrial biopsy.    TUBAL LIGATION         Family History:   Family History   Problem Relation Age of Onset    Respiratory Brother         Asthma    Cancer No family hx of     Diabetes No family hx of     Heart Disease No family hx of     Hypertension No family hx of     Lipids No family hx of          Social History:   Social History     Tobacco Use    Smoking status: Never    Smokeless tobacco: Never    Tobacco comments:     no smokers in household   Vaping Use    Vaping status: Never Used   Substance Use Topics    Alcohol use: Yes     Alcohol/week: 0.0 standard drinks of alcohol     Comment: wine cooler x2/3-4x per yr    Drug use: No       Review of Systems:   Pertinent items are noted in HPI or as in patient entered ROS below, remainder of complete ROS is negative.        No data to display                  Physical Exam:   Ht 1.676 m (5' 6\")   LMP 05/04/2024 (Exact Date)   BMI 42.45 kg/m       Constitutional:  The patient was unaccompanied, well-groomed, and in no acute distress.    Skin:  Warm and pink.    Neurologic:  Alert and oriented x 3.  CN's III-XII within normal limits.  Voice normal.   Psychiatric:  The patient's affect was calm, cooperative, and appropriate.    Respiratory:  Breathing comfortably without stridor or exertion of accessory muscles.    Eyes: Extraocular movement intact.    Head:  Normocephalic and atraumatic.  No " lesions or scars.    Ears:  Pinnae and tragus non-tender.  EAC's and TM's were clear.   Nose:  Sinuses were non-tender.  Anterior rhinoscopy revealed midline septum and absence of purulence or polyps.    OC/OP:  Normal tongue, floor of mouth, buccal mucosa, and palate. Small fibroma of the lower lip. No abnormal lymph tissue in the oropharynx.  The pterygoid region is non-tender.  Right lip m,ass.   Neck:  Supple with normal laryngeal and tracheal landmarks.  The parotid beds were without masses.  No palpable thyroid.  Lymphatic:  There is no palpable lymphadenopathy in the neck.     Procedure: excision of oropharynx lesion I went ahead and anesthetized the right lip lesion.    Anesthetic: 1cc of 1% lidocaine injected into lip     After informed consent was obtained. Anesthetic was administer. The lip lesion was grasped with forceps and sharply excised. We will send for routine pathology. Silver nitrate was used for cauterization at the base of the lesion. The patient tolerated the procedure well without concerns.      Assessment and Plan:    ICD-10-CM    1. Mucocele of lower lip  K13.0 Pediatric ENT  Referral         44 year old female with a history of Rheumatoid Arthritis who presents for consultation regarding a mucocele of the lower lip. Upon examination, I believe the area to actually be a small fibroma of the lower lip.  There was three 4-0 chromic sutures used to close the lip.    Follow-up: No follow-ups on file.         Scribe Disclosure:   I, Dario Cheng, am serving as a scribe; to document services personally performed by Mitchell Jo MD -based on data collection and the provider's statements to me.     Provider Disclosure:  I agree with above History, Review of Systems, Physical exam and Plan.  I have reviewed the content of the documentation and have edited it as needed. I have personally performed the services documented here and the documentation accurately represents those services  and the decisions I have made.      Electronically signed by:

## 2024-07-09 ENCOUNTER — E-VISIT (OUTPATIENT)
Dept: URGENT CARE | Facility: CLINIC | Age: 44
End: 2024-07-09
Payer: COMMERCIAL

## 2024-07-09 DIAGNOSIS — N39.0 ACUTE UTI (URINARY TRACT INFECTION): Primary | ICD-10-CM

## 2024-07-09 PROCEDURE — 99421 OL DIG E/M SVC 5-10 MIN: CPT | Performed by: EMERGENCY MEDICINE

## 2024-07-09 RX ORDER — NITROFURANTOIN 25; 75 MG/1; MG/1
100 CAPSULE ORAL 2 TIMES DAILY
Qty: 10 CAPSULE | Refills: 0 | Status: SHIPPED | OUTPATIENT
Start: 2024-07-09 | End: 2024-07-14

## 2024-07-23 ENCOUNTER — VIRTUAL VISIT (OUTPATIENT)
Dept: RHEUMATOLOGY | Facility: CLINIC | Age: 44
End: 2024-07-23
Attending: NURSE PRACTITIONER
Payer: COMMERCIAL

## 2024-07-23 DIAGNOSIS — M45.9 ANKYLOSING SPONDYLITIS (H): Primary | ICD-10-CM

## 2024-07-23 NOTE — Clinical Note
7/23/2024       RE: Hannah Pacheco  [address]  [place] [address]     Dear Colleague,    Thank you for referring your patient, Hannah Pacheco, to the [facility] [place] at [facility]. Please see a copy of my visit note below.    Medication Therapy Management (MTM) Encounter    ASSESSMENT:                            Medication Adherence/Access: No issues identified    Ankylosing Spondylitis:     PLAN:                            Continue Cosentyx 150 mg subcutaneous injection every 28 days.     Complete routine lab monitoring every     Follow-up: with MTM pharmacist on     SUBJECTIVE/OBJECTIVE:                          Hannah Pacheco is a 44 year old female seen for a follow-up visit.       Reason for visit: Enbrel follow-up.    Allergies/ADRs: Reviewed in chart  Past Medical History: Reviewed in chart  Tobacco: She reports that she has never smoked. She has never used smokeless tobacco.  Alcohol: none    Medication Adherence/Access: no issues reported    Ankylosing Spondylitis:   Cosentyx 150 mg subcutaneous injection every 28 days  Naproxen 220 mg as needed   Patient started Cosentyx on 6/19 and has taken 2 doses.      No reactions - going well     Headaches - shortly after dose, 30min-1hr later and resolves within few hours     No issues with specialty pharmacy - no issues with access     Maybe slightly better in feet - plantar faciitis improved     Pain has not been worse     Takes ibuprofen and naproxen only when really needed     Got UTI - resolved       Today's Vitals: There were no vitals taken for this visit.  ----------------    I spent *** minutes with this patient today. All changes were made via collaborative practice agreement with Eveline Trujillo NP. A copy of the visit note was provided to the patient's provider(s).    A summary of these recommendations was sent via [employer].    Franklin Jolley  PharmD  Medication Therapy Management Pharmacist  [facility]  Phone: [phone]     Telemedicine Visit Details  Type of service:  Telephone visit  Start Time:  3:30 PM  End Time: ***     Medication Therapy Recommendations  No medication therapy recommendations to display           Again, thank you for allowing me to participate in the care of your patient.      Sincerely,    Franklin Jolley RPH

## 2024-07-23 NOTE — PROGRESS NOTES
Medication Therapy Management (MTM) Encounter    ASSESSMENT:                            Medication Adherence/Access: No issues identified    Ankylosing Spondylitis: Patient previously experienced injection site reactions with both Humira and Enbrel, but was able to start Cosentyx without issue. Tolerating injections well and would benefit from continuing therapy. Discussed minor headache and patient able to take as needed acetaminophen or ibuprofen to relieve. Expect headaches to improve as therapy continues. Discussed time to onset and medication may take longer to reach full effect. Loading dose was denied by insurance and increasing dose to 300 mg every 28 days or 150 mg every 14 days could be possible options in the future if patient is not reaching treatment goals. MTM follow-up scheduled in September to reevaluate therapy at 3 month alex.      PLAN:                            Continue Cosentyx 150 mg subcutaneous injection every 28 days.     Complete routine lab monitoring every 3 months.   Due in September.     Okay to take acetaminophen or ibuprofen as needed for headaches related to injection.   Reach out if headaches worsen with doses.     Follow-up: with MTM pharmacist on 9/19/24.     SUBJECTIVE/OBJECTIVE:                          Hannah Pacheco is a 44 year old female seen for a follow-up visit.       Reason for visit: Cosentyx follow-up.    Allergies/ADRs: Reviewed in chart  Past Medical History: Reviewed in chart  Tobacco: She reports that she has never smoked. She has never used smokeless tobacco.  Alcohol: none    Medication Adherence/Access: no issues reported    Ankylosing Spondylitis:   Cosentyx 150 mg subcutaneous injection every 28 days  Naproxen 220 mg as needed   Patient started Cosentyx on 6/19 and has taken 2 doses. Reported injections are going well - no issues with injection site reactions. Noticed a minor headache about 30-60 minutes after doses that resolved within a few hours.  Experiencing some improvement in her feet/plantar fasciitis pain. Much less severe than it used to be. Otherwise reported that other pain and stiffness is about the same. Using ibuprofen or naproxen only when pain is significant. No issues with access and specialty pharmacy has been easy to work with.     Today's Vitals: There were no vitals taken for this visit.  ----------------    I spent 20 minutes with this patient today. All changes were made via collaborative practice agreement with Eveline Trujillo NP. A copy of the visit note was provided to the patient's provider(s).    A summary of these recommendations was sent via [employer]    Yuri HeartD  Medication Therapy Management Pharmacist  [facility]  Phone: [phone]     Telemedicine Visit Details  Type of service:  Telephone visit  Start Time:  3:30 PM  End Time:  3:50 PM     Medication Therapy Recommendations  No medication therapy recommendations to display

## 2024-07-30 NOTE — PATIENT INSTRUCTIONS
"Recommendations from today's MTM visit:                                                       Continue Cosentyx 150 mg subcutaneous injection every 28 days.     Complete routine lab monitoring every 3 months.   Due in September.     Okay to take acetaminophen or ibuprofen as needed for headaches related to injection.   Reach out if headaches worsen with doses.     Follow-up: with MTM pharmacist on 9/19/24.     It was great speaking with you today.  I value your experience and would be very thankful for your time in providing feedback in our clinic survey. In the next few days, you may receive an email or text message from [employer] with a link to a survey related to your  clinical pharmacist.\"     To schedule another MTM appointment, please call the clinic directly or you may call the MTM scheduling line at [phone].    My Clinical Pharmacist's contact information:                                                      Please feel free to contact me with any questions or concerns you have.      Franklin Jolley, PharmD  Medication Therapy Management Pharmacist  [facility]  Phone: [phone]    "

## 2024-09-23 ENCOUNTER — LAB (OUTPATIENT)
Dept: LAB | Facility: CLINIC | Age: 44
End: 2024-09-23
Payer: COMMERCIAL

## 2024-09-23 DIAGNOSIS — M45.8 ANKYLOSING SPONDYLITIS OF SACROCOCCYGEAL REGION (H): ICD-10-CM

## 2024-09-23 LAB
ALBUMIN SERPL BCG-MCNC: 4 G/DL (ref 3.5–5.2)
ALT SERPL W P-5'-P-CCNC: 16 U/L (ref 0–50)
AST SERPL W P-5'-P-CCNC: 17 U/L (ref 0–45)
CREAT SERPL-MCNC: 0.75 MG/DL (ref 0.51–0.95)
CRP SERPL-MCNC: 26.69 MG/L
EGFRCR SERPLBLD CKD-EPI 2021: >90 ML/MIN/1.73M2
ERYTHROCYTE [DISTWIDTH] IN BLOOD BY AUTOMATED COUNT: 12.5 % (ref 10–15)
ERYTHROCYTE [SEDIMENTATION RATE] IN BLOOD BY WESTERGREN METHOD: 13 MM/HR (ref 0–20)
HCT VFR BLD AUTO: 38.7 % (ref 35–47)
HGB BLD-MCNC: 13 G/DL (ref 11.7–15.7)
MCH RBC QN AUTO: 31.4 PG (ref 26.5–33)
MCHC RBC AUTO-ENTMCNC: 33.6 G/DL (ref 31.5–36.5)
MCV RBC AUTO: 94 FL (ref 78–100)
PLATELET # BLD AUTO: 217 10E3/UL (ref 150–450)
RBC # BLD AUTO: 4.14 10E6/UL (ref 3.8–5.2)
WBC # BLD AUTO: 6.4 10E3/UL (ref 4–11)

## 2024-09-23 PROCEDURE — 84460 ALANINE AMINO (ALT) (SGPT): CPT

## 2024-09-23 PROCEDURE — 85652 RBC SED RATE AUTOMATED: CPT

## 2024-09-23 PROCEDURE — 86140 C-REACTIVE PROTEIN: CPT

## 2024-09-23 PROCEDURE — 85027 COMPLETE CBC AUTOMATED: CPT

## 2024-09-23 PROCEDURE — 82565 ASSAY OF CREATININE: CPT

## 2024-09-23 PROCEDURE — 82040 ASSAY OF SERUM ALBUMIN: CPT

## 2024-09-23 PROCEDURE — 84450 TRANSFERASE (AST) (SGOT): CPT

## 2024-09-23 PROCEDURE — 36415 COLL VENOUS BLD VENIPUNCTURE: CPT

## 2024-09-24 ENCOUNTER — VIRTUAL VISIT (OUTPATIENT)
Dept: RHEUMATOLOGY | Facility: CLINIC | Age: 44
End: 2024-09-24
Attending: NURSE PRACTITIONER
Payer: COMMERCIAL

## 2024-09-24 DIAGNOSIS — M45.9 ANKYLOSING SPONDYLITIS (H): Primary | ICD-10-CM

## 2024-09-24 RX ORDER — SECUKINUMAB 150 MG/ML
150 INJECTION SUBCUTANEOUS
Qty: 1 ML | Refills: 5 | Status: SHIPPED | OUTPATIENT
Start: 2024-09-24

## 2024-09-24 NOTE — PATIENT INSTRUCTIONS
"Recommendations from today's MTM visit:                                                       I recommend being seen by urgent care or your primary care provider to evaluate for a possible infection.     Once you are feeling better, you are able to continue Cosentyx 150 mg subcutaneous injection every 28 days.   Next dose is due on 10/15.     Continue to monitor for side effects and injection site reactions.   If things worsen, let me know.     Follow-up: with Eveline Trujillo NP on 11/7/24; with MTM pharmacist on 2/25/25.     It was great speaking with you today.  I value your experience and would be very thankful for your time in providing feedback in our clinic survey. In the next few days, you may receive an email or text message from [facility] [email or web address] with a link to a survey related to your  clinical pharmacist.\"     To schedule another MTM appointment, please call the clinic directly or you may call the MTM scheduling line at [phone].    My Clinical Pharmacist's contact information:                                                      Please feel free to contact me with any questions or concerns you have.      Franklin Jolley, PharmD  Medication Therapy Management Pharmacist  [facility]  Phone: [phone]    "

## 2024-09-24 NOTE — Clinical Note
FYI - Overall improvement with Cosentyx. Much better tolerated and expect issue with last dose to be related to auto-injector issue.

## 2024-09-24 NOTE — PROGRESS NOTES
Medication Therapy Management (MTM) Encounter    ASSESSMENT:                            Medication Adherence/Access: No issues identified    Ankylosing Spondylitis: Patient has been on Cosentyx for about 3 months and has experienced improvement with therapy. Previously tolerated injections well and has not noticed injection site reactions with other doses. Discussed the leg numbness with the most recent dose and it seems to be related to a device malfunction, as the injection seemed much more rough than previous injections. Advised patient to continue to monitor injection site and reach out if a reaction develops after future doses. Also encouraged patient to get evaluated for a possible infection. Next dose is due on 10/15 and patient is able to continue Cosentyx as prescribed as long as symptoms have resolved. Rheumatology follow-up scheduled in November. MTM follow-up in February.     PLAN:                            I recommend being seen by urgent care or your primary care provider to evaluate for a possible infection.     Once you are feeling better, you are able to continue Cosentyx 150 mg subcutaneous injection every 28 days.   Next dose is due on 10/15.     Continue to monitor for side effects and injection site reactions.   If things worsen, let me know.     Follow-up: with Eveline Trujillo NP on 11/7/24; with MTM pharmacist on 2/25/25.     SUBJECTIVE/OBJECTIVE:                          Hannah Pacheco is a 44 year old female seen for a follow-up visit.       Reason for visit: Cosentyx follow-up.    Allergies/ADRs: Reviewed in chart  Past Medical History: Reviewed in chart  Tobacco: She reports that she has never smoked. She has never used smokeless tobacco.  Alcohol: none    Medication Adherence/Access: no issues reported    Ankylosing Spondylitis:   Cosentyx 150 mg subcutaneous injection every 28 days  Naproxen 220 mg as needed   Patient reported about 60% improvement since starting Cosentyx. More noticeable  to  who has seen increased activity levels and physical abilities. Was not having any issues with injections until most recent injection on 9/17. Administered dose in leg and woke up the next morning with some numbness and tingling in the same leg. Reported the injection was louder and more painful than normal. Had to administer 1 week late due to delivery issue. Did not notice any increased symptoms with delay. Still experiencing a headache that lasts about a week after each dose. More severe first few days after injection, then improves. Reported sinus congestion, sneezing, and cough started about 3 days ago. Fever, chills, and body aches started last night.      Today's Vitals: There were no vitals taken for this visit.  ----------------    I spent 25 minutes with this patient today. All changes were made via collaborative practice agreement with Eveline Trujillo NP. A copy of the visit note was provided to the patient's provider(s).    A summary of these recommendations was sent via [employer].    Yuri HeartD  Medication Therapy Management Pharmacist  [facility]  Phone: [phone]     Telemedicine Visit Details  The patient's medications can be safely assessed via a telemedicine encounter.  Type of service:  Telephone visit  Originating Location (pt. Location): Home    Distant Location (provider location):  On-site  Start Time:  8:30 AM  End Time:  8:55 AM     Medication Therapy Recommendations  No medication therapy recommendations to display

## 2024-09-26 ENCOUNTER — TELEPHONE (OUTPATIENT)
Dept: RHEUMATOLOGY | Facility: CLINIC | Age: 44
End: 2024-09-26
Payer: COMMERCIAL

## 2024-09-26 NOTE — TELEPHONE ENCOUNTER
Called pt to follow up per providers request in regards to CRP increase. Pt states she's been having sinus congestion, with a slight cough and had fever and chills a few days ago. Advised pt to follow up with PCP to rule out infection. Will let her provider know and get back with  recommendations if any.    Nancy PLAZA RN  Adult Rheumatology Clinic

## 2024-10-08 ENCOUNTER — ALLIED HEALTH/NURSE VISIT (OUTPATIENT)
Dept: FAMILY MEDICINE | Facility: CLINIC | Age: 44
End: 2024-10-08
Payer: COMMERCIAL

## 2024-10-08 DIAGNOSIS — Z23 ENCOUNTER FOR IMMUNIZATION: Primary | ICD-10-CM

## 2024-10-08 PROCEDURE — 90471 IMMUNIZATION ADMIN: CPT

## 2024-10-08 PROCEDURE — 90750 HZV VACC RECOMBINANT IM: CPT

## 2024-10-08 PROCEDURE — 90472 IMMUNIZATION ADMIN EACH ADD: CPT

## 2024-10-08 PROCEDURE — 90746 HEPB VACCINE 3 DOSE ADULT IM: CPT

## 2024-10-08 NOTE — PROGRESS NOTES
Prior to immunization administration, verified patients identity using patient s name and date of birth. Please see Immunization Activity for additional information.     Is the patient's temperature normal (100.5 or less)? Yes     Patient MEETS CRITERIA. PROCEED with vaccine administration.      Patient instructed to remain in clinic for 15 minutes afterwards, and to report any adverse reactions.      Link to Ancillary Visit Immunization Standing Orders SmartSet     Screening performed by Julianne Nolan on 10/8/2024 at 9:34 AM.

## 2024-10-21 ENCOUNTER — TRANSFERRED RECORDS (OUTPATIENT)
Dept: HEALTH INFORMATION MANAGEMENT | Facility: CLINIC | Age: 44
End: 2024-10-21
Payer: COMMERCIAL

## 2024-11-07 ENCOUNTER — OFFICE VISIT (OUTPATIENT)
Dept: RHEUMATOLOGY | Facility: CLINIC | Age: 44
End: 2024-11-07
Payer: COMMERCIAL

## 2024-11-07 VITALS
BODY MASS INDEX: 42.63 KG/M2 | TEMPERATURE: 98.4 F | SYSTOLIC BLOOD PRESSURE: 133 MMHG | WEIGHT: 264.1 LBS | DIASTOLIC BLOOD PRESSURE: 83 MMHG | HEART RATE: 82 BPM | OXYGEN SATURATION: 99 %

## 2024-11-07 DIAGNOSIS — M15.0 PRIMARY OSTEOARTHRITIS INVOLVING MULTIPLE JOINTS: ICD-10-CM

## 2024-11-07 DIAGNOSIS — E66.813 CLASS 3 SEVERE OBESITY WITHOUT SERIOUS COMORBIDITY WITH BODY MASS INDEX (BMI) OF 40.0 TO 44.9 IN ADULT, UNSPECIFIED OBESITY TYPE (H): Primary | ICD-10-CM

## 2024-11-07 DIAGNOSIS — E66.01 CLASS 3 SEVERE OBESITY WITHOUT SERIOUS COMORBIDITY WITH BODY MASS INDEX (BMI) OF 40.0 TO 44.9 IN ADULT, UNSPECIFIED OBESITY TYPE (H): Primary | ICD-10-CM

## 2024-11-07 DIAGNOSIS — E55.9 VITAMIN D DEFICIENCY: ICD-10-CM

## 2024-11-07 PROCEDURE — G2211 COMPLEX E/M VISIT ADD ON: HCPCS | Performed by: NURSE PRACTITIONER

## 2024-11-07 PROCEDURE — 99215 OFFICE O/P EST HI 40 MIN: CPT | Performed by: NURSE PRACTITIONER

## 2024-11-07 RX ORDER — ERGOCALCIFEROL 1.25 MG/1
50000 CAPSULE, LIQUID FILLED ORAL
Qty: 12 CAPSULE | Refills: 0 | Status: SHIPPED | OUTPATIENT
Start: 2024-11-07 | End: 2024-11-07

## 2024-11-07 ASSESSMENT — PAIN SCALES - GENERAL: PAINLEVEL_OUTOF10: MILD PAIN (3)

## 2024-11-07 NOTE — PATIENT INSTRUCTIONS
1) Cosentyx 150 mg every 4 weeks  -Benadryl 1 hour before hand    2) Labs every 3 months, have them check vit d with next lab draw    3) Vit d over the counter 1000 international unit(s) daily    4) Voltaren gel up to 4 times a day to painful joints    5) See me back 4-6 months     6) Wgt loss referral    no

## 2024-11-07 NOTE — PROGRESS NOTES
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 Current treatment: Cosentyx (IL-17) 150 mg every 4 weeks (6/24) , drisdo 50,000 international unit(s) weekly.     11/07/24:    Joints/Back:  Has had 60% improvement in joint back pain and stiffness. Before had a hard time walking at end of day.   has noticed a difference.     ROS: No infections, fevers, no diarrhea, abd pain. Is sleeping well.   Did have UTI tx with abxs in July 2024.      3/27/2024:  Here for results     2/28/2024 Consult  HPI: 2008 had hyperemesis during pregnancy, had PICC line, developed blood clot and then sepsis from a PICC line. Was in ICU for 1-2 weeks, hospitalized for over a month. With in 1 year after this and after delivery developed pain all over all the time. Does not remember joint swelling. Saw rheumatologist was dx with spondyloarthropathy and took SSZ, didn't take it for long.  Then stopped going. Had psychology/mental health factors of going to .   Now that getting older things are getting harder.   Currently symptoms are; wide spread pain, feels like a bruise all over, not limited to joints. Joints specifically intense ache, deep and sometimes feet stabbing. End of day swelling of feet ankles. Occ swelling of wrists.  Taking alleve daily x 2 weeks, takes edge off and helps with HA.   Everything is stiff in am. When she places her feet in am, it takes her a min to be able to bear weight.     ROS: Patient denies recent infections, fevers, DIPTI, weight loss, diarrhea, rashes, SOB, mouth sores,  SICCA, miscarriages, raynauds.   + R eye double vision, recent. This started 2 weeks ago,  has eye appt next week. Feels like brain is swelling. HA every day in last 2 weeks.   + Numbness tingling in feet.   +Sick after eats sometimes, icky burps, heartburn.   + poor sleep, hard time fallen sleep and staying sleep. Doesn't think snores, has not had sleep apnea steady.   +Off and swelling in bl calves the last month with pain, L>R     Past Rheum hx:   Tx by [facility] Dr. Young for spondyloarthropathy in past as well as Dr. Les Skaggs     Past Rheum tx:  SSZ  Humira 40 mg eow (4/28/24) -injection site rxn - didn't perceive a benefit   Enbrel-injection site rxn (2 shoots) - likely cross reaction could consider going back     Social History  Wilfrido Yancey lives with , 5 kids  Works, clean houses,  and administration  No tobacco,  smokes  No ETOH  Walks , yoga     FMH:  Mom-OA, thyroid  2 aunts with PSO  2 aunts and a cousin ? MS     Active Problem list:  Patient Active Problem List    Diagnosis Date Noted    Morbid obesity (H) 01/19/2023     Priority: Medium    RA (rheumatoid arthritis) (H) 06/14/2016     Priority: Medium    Chronic fatigue syndrome 06/14/2016     Priority: Medium    PFO (patent foramen ovale) 01/15/2016     Priority: Medium    History of sepsis- blood clot and septic PICC, PFO found.  requesting records as they are archieved.  01/15/2016     Priority: Medium    Hyperlipidemia LDL goal <130 01/15/2016     Priority: Medium    MARGARETTE III (cervical intraepithelial neoplasia grade III) with severe dysplasia 09/05/2005     Priority: Medium     10/11/02 ASCUS  9/11/03 LSIL/CIN1  6/29/05 ASCUS/ + HR HPV (16)  8/15/05 colp: MARGARETTE 2  9/12/05 ECC: CIN3, negative margins  10/4/05 CKC: EMB WNL, ECC WNL, Cervix bx CIN3, negative margins  12/9/06, 11/16/07, 11/3/12, paps all NIL  1/15/16 Pap NIL, Neg HR HPV. First cotest since [facility]. Plan: repeat cotest in 12 mo.   04/08/17 Would consider patient to be lost to follow-up.  7/21/17 NIL Pap, Neg HPV. Plan cotest in 3 years.   1/19/23 NIL  pap, + HR HPV # 18. Plan: [facility] bef 4/19/23   3/13/23 [facility] ECC: no MARGARETTE. Plan: cotest in 1 yr.   3/15/24 NIL Pap, Neg HR HPV. Plan: cotest in 1 yr      Plantar fascial fibromatosis 09/11/2003     Priority: Medium            Objective:     Physical Exam  /83 (BP Location: Left arm, Patient Position: Sitting, Cuff Size: Adult Large)   Pulse 82   Temp 98.4  F (36.9  C) (Oral)   Wt 119.8 kg (264 lb 1.6 oz)   LMP 11/01/2024 (Exact Date)   SpO2 99%   Breastfeeding No   BMI 42.63 kg/m    Body mass index is 42.63 kg/m .    Constitutional: Lg  body habitus with out gross deformities. Well groomed.   Psych: nl judgement, orientation, memory, affect.    MSK- (T=tender to palpation, S=swelling)    Wrists: -T/S, FROM   Hands: FROM, Normal  strength. PIPs enlarged, L with 1 mild swelling. No dactylitis.  Bl large CMC bony changes    Ankles: R achilles mild T/S, L normal   Feet: -T/S, FROM . No dactylitis. 1st MTP bony changes      Labs:    Lab Results   Component Value Date    ALT 16 09/23/2024    AST 17 09/23/2024    CR 0.75 09/23/2024    CRP 10.9 (H) 06/14/2016   +AURORA  +SSA    Imaging:            Assessment and Plan:     1) Spondyloarthropathy/ ankylosing spondylitis SI and enthesitis: This is a 44 yr old female previously dx of spondyloarthropathy by Dr. Mendez at [facility] with mild swelling in hand and feet joints documented in 2010, pt was started on SSZ and lost to Follow-up. Pt here today with an increase in wide spread diffuse pain and poor sleep as well as an increase in joint specific pain that is a deep ache. Reports occ swelling of wrists and ankles. Biggest complaint is achilles pain and swelling with significant pain in am. Achilles symptoms limits her ability to be active, pt reports this is her number one concern. In addition reports numbness and tingling of MTP pads. Exam finds bl achilles TTP and ? Swelling of R.  Labs show past: -RF,-CCP. +AURORA, +SSA. Given past dx of spondy with question of  "enthesitis will do L ankle MRI and also refer pt to podiatry to explore non inflammatory causes.  MRI SI found acute on chronic sacroiliitis, MRI ankle moderate planter fascitis.     11/07/2024 Update: Switched to Consentyx due to humira and enbrel having injection site reactions. Now at 3 months has 60% improvement, will continue.        2) +AURORA, +SSA: Question of lower peripheral neuropathy and upper GI symptoms with only being able to eat small meals and \"yucky burps\".  No SICCA complex. -repeat AURORA negative, JOHN negative.    3) Chronic pain syndrome with poor sleep: Will start pt on Trazodone, recommend she discuss sleep study and cymbalta with PCP. : Update, sleeping better so did not start trazodone, will discontinue.     4) Vit d def: Finished replacement. Will do 1,000 international unit(s) daily and recheck.    5) Obesity: Discussed affect of wgt on joint health. Referred to wgt management.    6) Has hx of old branch retinal vein occlusion with macular edema left eye. Follows with [facility]      Continue with long term management of chronic medical condition.     Pt instructions:     1) Cosentyx 150 mg every 4 weeks  -Benadryl 1 hour before hand    2) Labs every 3 months, have them check vit d with next lab draw    3) Vit d over the counter 1000 international unit(s) daily    4) Voltaren gel up to 4 times a day to painful joints    5) See me back 4-6 months     6) Wgt loss referral     Pt states understanding and agreement to plan of care, has no further questions.   45 minute spent on the date of the encounter doing chart review, history and exam, documentation and further activities per the note      Eveline Trujillo, CNP  Rheumatology, [facility]              "

## 2024-11-07 NOTE — NURSING NOTE
"Hannah Pacheco's goals for this visit include:   Chief Complaint   Patient presents with    RECHECK     Follow-up Ankylosing spondylitis       PCP: Elizabeth Bradley    Referring Provider:  Referred Self, MD  No address on file      Initial /83 (BP Location: Left arm, Patient Position: Sitting, Cuff Size: Adult Large)   Pulse 82   Temp 98.4  F (36.9  C) (Oral)   Wt 119.8 kg (264 lb 1.6 oz)   LMP 11/01/2024 (Exact Date)   SpO2 99%   Breastfeeding No   BMI 42.63 kg/m   Estimated body mass index is 42.63 kg/m  as calculated from the following:    Height as of 7/2/24: 1.676 m (5' 6\").    Weight as of this encounter: 119.8 kg (264 lb 1.6 oz).    Medication Reconciliation: complete    Do you need any medication refills at today's visit? none    DEANNE Brown  Rheumatology/Infectious disease  [facility]   [phone]      "

## 2024-12-23 ENCOUNTER — LAB (OUTPATIENT)
Dept: LAB | Facility: CLINIC | Age: 44
End: 2024-12-23
Payer: COMMERCIAL

## 2024-12-23 DIAGNOSIS — M45.8 ANKYLOSING SPONDYLITIS OF SACROCOCCYGEAL REGION (H): ICD-10-CM

## 2024-12-23 DIAGNOSIS — E55.9 VITAMIN D DEFICIENCY: ICD-10-CM

## 2024-12-23 LAB
ALBUMIN SERPL BCG-MCNC: 4.2 G/DL (ref 3.5–5.2)
ALT SERPL W P-5'-P-CCNC: 10 U/L (ref 0–50)
AST SERPL W P-5'-P-CCNC: 17 U/L (ref 0–45)
CREAT SERPL-MCNC: 0.82 MG/DL (ref 0.51–0.95)
CRP SERPL-MCNC: 4.53 MG/L
EGFRCR SERPLBLD CKD-EPI 2021: 90 ML/MIN/1.73M2
ERYTHROCYTE [DISTWIDTH] IN BLOOD BY AUTOMATED COUNT: 12.4 % (ref 10–15)
ERYTHROCYTE [SEDIMENTATION RATE] IN BLOOD BY WESTERGREN METHOD: 9 MM/HR (ref 0–20)
HCT VFR BLD AUTO: 39 % (ref 35–47)
HGB BLD-MCNC: 13.1 G/DL (ref 11.7–15.7)
MCH RBC QN AUTO: 31.5 PG (ref 26.5–33)
MCHC RBC AUTO-ENTMCNC: 33.6 G/DL (ref 31.5–36.5)
MCV RBC AUTO: 94 FL (ref 78–100)
PLATELET # BLD AUTO: 250 10E3/UL (ref 150–450)
RBC # BLD AUTO: 4.16 10E6/UL (ref 3.8–5.2)
VIT D+METAB SERPL-MCNC: 18 NG/ML (ref 20–50)
WBC # BLD AUTO: 5.8 10E3/UL (ref 4–11)

## 2024-12-23 PROCEDURE — 84450 TRANSFERASE (AST) (SGOT): CPT

## 2024-12-23 PROCEDURE — 82040 ASSAY OF SERUM ALBUMIN: CPT

## 2024-12-23 PROCEDURE — 85027 COMPLETE CBC AUTOMATED: CPT

## 2024-12-23 PROCEDURE — 36415 COLL VENOUS BLD VENIPUNCTURE: CPT

## 2024-12-23 PROCEDURE — 82565 ASSAY OF CREATININE: CPT

## 2024-12-23 PROCEDURE — 84460 ALANINE AMINO (ALT) (SGPT): CPT

## 2024-12-23 PROCEDURE — 85652 RBC SED RATE AUTOMATED: CPT

## 2024-12-23 PROCEDURE — 82306 VITAMIN D 25 HYDROXY: CPT

## 2024-12-23 PROCEDURE — 86140 C-REACTIVE PROTEIN: CPT

## 2025-02-24 ENCOUNTER — PATIENT OUTREACH (OUTPATIENT)
Dept: FAMILY MEDICINE | Facility: CLINIC | Age: 45
End: 2025-02-24
Payer: COMMERCIAL

## 2025-02-26 ENCOUNTER — MYC MEDICAL ADVICE (OUTPATIENT)
Dept: RHEUMATOLOGY | Facility: CLINIC | Age: 45
End: 2025-02-26
Payer: COMMERCIAL

## 2025-03-05 ENCOUNTER — PATIENT OUTREACH (OUTPATIENT)
Dept: CARE COORDINATION | Facility: CLINIC | Age: 45
End: 2025-03-05
Payer: COMMERCIAL

## 2025-03-17 ENCOUNTER — TELEPHONE (OUTPATIENT)
Dept: PHARMACY | Facility: CLINIC | Age: 45
End: 2025-03-17

## 2025-03-17 NOTE — TELEPHONE ENCOUNTER
Pt is due for follow up with Franklin ABBOTT P    Call placed to pt to initiate scheduling on 3/17/25    Outcome: Pt did not answer, LVM.      *Pt has no showed and cx the last three appointments with Franklin and not r/s. Due to this unsuccessful attempt and previous appt hx, will send discharge letter.*

## 2025-03-19 ENCOUNTER — PATIENT OUTREACH (OUTPATIENT)
Dept: CARE COORDINATION | Facility: CLINIC | Age: 45
End: 2025-03-19
Payer: COMMERCIAL

## 2025-04-07 ENCOUNTER — TELEPHONE (OUTPATIENT)
Dept: FAMILY MEDICINE | Facility: CLINIC | Age: 45
End: 2025-04-07
Payer: COMMERCIAL

## 2025-04-07 NOTE — LETTER
April 7, 2025    To  Hannah Pacheco  [address]  [place] [address]    Your team at [facility] cares about your health. We have reviewed your chart and based on our findings; we are making the following recommendations to better manage your health.     You are in particular need of attention regarding the following:     Call or MyChart message your clinic to schedule a colonoscopy, schedule/ a FIT Test, or order a Cologuard test. If you are unsure what type of test you need, please call your clinic and speak to clinic staff.   Colon cancer is now the second leading cause of cancer-related deaths in the [facility] for both men and women and there are over 130,000 new cases and 50,000 deaths per year from colon cancer. Colonoscopies can prevent 90-95% of these deaths. Problem lesions can be removed before they ever become cancer. This test is not only looking for cancer, but also getting rid of precancerous lesions.   If you are under/uninsured, we recommend you contact the [employer] Program.[employer] is a free colorectal cancer screening program that provides colonoscopies for eligible under/uninsured [place] men and women. If you are interested in receiving a free colonoscopy, please call [employer] at t [phone] (mention code [employer]) to see if you're eligible. Please have them send us the results.   Schedule a primary care office visit with your provider for a Pap Smear to screen for Cervical Cancer.  Complete the attached questionnaires to ensure your mental health needs are being properly met.  Please fill them out and send back to us via [employer], and feel free to call us with any questions or concerns at [phone].    PREVENTATIVE VISIT: Physical    If you have already completed these items, please contact the clinic via phone or   [facility] so your care team can review and update your records. Thank you for   choosing [facility] for your  healthcare needs. For any questions,   concerns, or to schedule an appointment please contact our clinic.    Healthy Regards,      Your [facility] Care Team            Electronically signed

## 2025-04-07 NOTE — TELEPHONE ENCOUNTER
Patient Quality Outreach    Patient is due for the following:   Colon Cancer Screening  Cervical Cancer Screening - PAP Needed  Depression  -  PHQ-9 needed  Physical Preventive Adult Physical    Action(s) Taken:   Schedule a Adult Preventative    Type of outreach:    Sent letter.    Questions for provider review:    None         Mary Powell MA  Chart routed to None.

## 2025-04-14 ENCOUNTER — TELEPHONE (OUTPATIENT)
Dept: FAMILY MEDICINE | Facility: CLINIC | Age: 45
End: 2025-04-14

## 2025-04-14 NOTE — LETTER
April 14, 2025    To  Hannah Pacheco  [address]  [place] [address]    Your team at [facility] cares about your health. We have reviewed your chart and based on our findings; we are making the following recommendations to better manage your health.     You are in particular need of attention regarding the following:     Call or MyChart message your clinic to schedule a colonoscopy, schedule/ a FIT Test, or order a Cologuard test. If you are unsure what type of test you need, please call your clinic and speak to clinic staff.   Colon cancer is now the second leading cause of cancer-related deaths in the [facility] for both men and women and there are over 130,000 new cases and 50,000 deaths per year from colon cancer. Colonoscopies can prevent 90-95% of these deaths. Problem lesions can be removed before they ever become cancer. This test is not only looking for cancer, but also getting rid of precancerous lesions.   If you are under/uninsured, we recommend you contact the [employer] Program.[employer] is a free colorectal cancer screening program that provides colonoscopies for eligible under/uninsured [place] men and women. If you are interested in receiving a free colonoscopy, please call [employer] at t [phone] (mention code [employer]) to see if you're eligible. Please have them send us the results.   Schedule a primary care office visit with your provider for a Pap Smear to screen for Cervical Cancer.  Complete the attached questionnaires to ensure your mental health needs are being properly met.  Please fill them out and send back to us via [employer], and feel free to call us with any questions or concerns at [phone].    PREVENTATIVE VISIT: Physical    If you have already completed these items, please contact the clinic via phone or   [employer] so your care team can review and update your records. Thank you for   choosing [facility] for your  healthcare needs. For any questions,   concerns, or to schedule an appointment please contact our clinic.    Healthy Regards,      Your [facility] Care Team           Electronically signed

## 2025-05-03 ENCOUNTER — HEALTH MAINTENANCE LETTER (OUTPATIENT)
Age: 45
End: 2025-05-03